# Patient Record
Sex: FEMALE | Race: BLACK OR AFRICAN AMERICAN | NOT HISPANIC OR LATINO | Employment: UNEMPLOYED | URBAN - METROPOLITAN AREA
[De-identification: names, ages, dates, MRNs, and addresses within clinical notes are randomized per-mention and may not be internally consistent; named-entity substitution may affect disease eponyms.]

---

## 2017-01-04 ENCOUNTER — LAB VISIT (OUTPATIENT)
Dept: LAB | Facility: OTHER | Age: 37
End: 2017-01-04
Attending: OBSTETRICS & GYNECOLOGY
Payer: MEDICAID

## 2017-01-04 ENCOUNTER — TELEPHONE (OUTPATIENT)
Dept: OBSTETRICS AND GYNECOLOGY | Facility: CLINIC | Age: 37
End: 2017-01-04

## 2017-01-04 ENCOUNTER — PATIENT MESSAGE (OUTPATIENT)
Dept: OBSTETRICS AND GYNECOLOGY | Facility: CLINIC | Age: 37
End: 2017-01-04

## 2017-01-04 ENCOUNTER — ROUTINE PRENATAL (OUTPATIENT)
Dept: OBSTETRICS AND GYNECOLOGY | Facility: CLINIC | Age: 37
End: 2017-01-04
Attending: OBSTETRICS & GYNECOLOGY
Payer: MEDICAID

## 2017-01-04 VITALS
SYSTOLIC BLOOD PRESSURE: 142 MMHG | BODY MASS INDEX: 33.66 KG/M2 | WEIGHT: 208.56 LBS | DIASTOLIC BLOOD PRESSURE: 78 MMHG

## 2017-01-04 DIAGNOSIS — L73.9 FOLLICULITIS: ICD-10-CM

## 2017-01-04 DIAGNOSIS — O09.513 ADVANCED MATERNAL AGE, PRIMIGRAVIDA, ANTEPARTUM, THIRD TRIMESTER: Primary | ICD-10-CM

## 2017-01-04 DIAGNOSIS — Z23 NEED FOR TDAP VACCINATION: ICD-10-CM

## 2017-01-04 PROCEDURE — 99999 PR PBB SHADOW E&M-EST. PATIENT-LVL II: CPT | Mod: PBBFAC,,, | Performed by: OBSTETRICS & GYNECOLOGY

## 2017-01-04 PROCEDURE — 99213 OFFICE O/P EST LOW 20 MIN: CPT | Mod: TH,S$PBB,, | Performed by: OBSTETRICS & GYNECOLOGY

## 2017-01-04 RX ORDER — SULFAMETHOXAZOLE AND TRIMETHOPRIM 400; 80 MG/1; MG/1
1 TABLET ORAL 2 TIMES DAILY
Qty: 14 TABLET | Refills: 0 | Status: SHIPPED | OUTPATIENT
Start: 2017-01-04 | End: 2017-01-11

## 2017-01-04 NOTE — TELEPHONE ENCOUNTER
I will ask Dr Sims to send it shortly. Chey  ===View-only below this line===      ----- Message -----     From: Dao Holt     Sent: 1/4/2017 12:19 PM CST       To: Sekou Sims MD  Subject: Prescription    Hi there I was checking back on my two prescriptions which is the prenatals an bactrim because I wanted to go pock them up now if possible    Thanks

## 2017-01-04 NOTE — PROGRESS NOTES
Reports good FM.  No bleeding.  Denies CTX.  Discussed mild pedal edema- BP and urine are normal.  DM screen 111.  She plans to have [id number] drawn today.  We also discussed exercise and pregnancy as well as dental work.  Reports having an ingrown hair on her right mons- discussed- she has Rx for abx.  Tdap ordered.  Follow-up [facility] morton 1/30/16.  Return 2 weeks.

## 2017-01-04 NOTE — TELEPHONE ENCOUNTER
----- Message from Marixa Mercado sent at 1/4/2017 12:17 PM CST -----  Contact: pt  _x  1st Request  _  2nd Request  _  3rd Request        Who: PAUL SAEZ [[id number]]    Why: pt 28 wks states she was prescribed prenatals and Bactrim after her visit this morning but did not see them on [facility].  Pt would like a call back.    What Number to Call Back: [phone]    When to Expect a call back: (Before the end of the day)   -- if call after 3:00 call back will be tomorrow.

## 2017-01-06 ENCOUNTER — TELEPHONE (OUTPATIENT)
Dept: OBSTETRICS AND GYNECOLOGY | Facility: CLINIC | Age: 37
End: 2017-01-06

## 2017-01-06 ENCOUNTER — PATIENT MESSAGE (OUTPATIENT)
Dept: OBSTETRICS AND GYNECOLOGY | Facility: CLINIC | Age: 37
End: 2017-01-06

## 2017-01-06 NOTE — TELEPHONE ENCOUNTER
I will share this information with Dr Sims and add to your chart at the next visit. Chey  ===View-only below this line===      ----- Message -----     From: aDo Holt     Sent: 1/6/2017 10:43 AM CST       To: Sekou Sims MD  Subject: Non-Urgent Medical    You wanted me to ask my mom her age she was diagnosed for breast cancer 48 an my aunt in her 50's

## 2017-01-12 ENCOUNTER — TELEPHONE (OUTPATIENT)
Dept: MATERNAL FETAL MEDICINE | Facility: CLINIC | Age: 37
End: 2017-01-12

## 2017-01-12 NOTE — TELEPHONE ENCOUNTER
Patient has been notified of NEGATIVE [id number] results. This specimen showed an expected representation of chromosomes 13, 18 and 21 material consistent with a MALE fetus.    Pt verbalized understanding of information.

## 2017-01-18 ENCOUNTER — CLINICAL SUPPORT (OUTPATIENT)
Dept: OBSTETRICS AND GYNECOLOGY | Facility: CLINIC | Age: 37
End: 2017-01-18
Attending: OBSTETRICS & GYNECOLOGY
Payer: MEDICAID

## 2017-01-18 ENCOUNTER — TELEPHONE (OUTPATIENT)
Dept: OBSTETRICS AND GYNECOLOGY | Facility: CLINIC | Age: 37
End: 2017-01-18

## 2017-01-18 VITALS
BODY MASS INDEX: 34.23 KG/M2 | WEIGHT: 212.06 LBS | SYSTOLIC BLOOD PRESSURE: 108 MMHG | DIASTOLIC BLOOD PRESSURE: 58 MMHG

## 2017-01-18 DIAGNOSIS — O09.513 ADVANCED MATERNAL AGE, PRIMIGRAVIDA, ANTEPARTUM, THIRD TRIMESTER: Primary | ICD-10-CM

## 2017-01-18 DIAGNOSIS — N76.4 ABSCESS, VULVA: ICD-10-CM

## 2017-01-18 DIAGNOSIS — Z23 NEED FOR TDAP VACCINATION: Primary | ICD-10-CM

## 2017-01-18 PROCEDURE — 99213 OFFICE O/P EST LOW 20 MIN: CPT | Mod: 25,TH,S$PBB, | Performed by: OBSTETRICS & GYNECOLOGY

## 2017-01-18 PROCEDURE — 99212 OFFICE O/P EST SF 10 MIN: CPT | Mod: PBBFAC,27,25 | Performed by: OBSTETRICS & GYNECOLOGY

## 2017-01-18 PROCEDURE — 99999 PR PBB SHADOW E&M-EST. PATIENT-LVL II: CPT | Mod: PBBFAC,,, | Performed by: OBSTETRICS & GYNECOLOGY

## 2017-01-18 PROCEDURE — 87075 CULTR BACTERIA EXCEPT BLOOD: CPT

## 2017-01-18 PROCEDURE — 87070 CULTURE OTHR SPECIMN AEROBIC: CPT

## 2017-01-18 PROCEDURE — 56405 I&D VULVA/PERINEAL ABSCESS: CPT | Mod: PBBFAC | Performed by: OBSTETRICS & GYNECOLOGY

## 2017-01-18 PROCEDURE — 56405 I&D VULVA/PERINEAL ABSCESS: CPT | Mod: S$PBB,,, | Performed by: OBSTETRICS & GYNECOLOGY

## 2017-01-18 PROCEDURE — 99999 PR PBB SHADOW E&M-EST. PATIENT-LVL I: CPT | Mod: PBBFAC,,,

## 2017-01-18 RX ORDER — SULFAMETHOXAZOLE AND TRIMETHOPRIM 400; 80 MG/1; MG/1
1 TABLET ORAL 2 TIMES DAILY
Qty: 14 TABLET | Refills: 0 | Status: SHIPPED | OUTPATIENT
Start: 2017-01-18 | End: 2017-01-25

## 2017-01-18 NOTE — PROGRESS NOTES
Good FM.  No bleeding.  Denies CTX.  Reports that the tender bump on her mons improved after Bactrim, but is now larger.  PE: 2.5 cm tender, firm, raised abscess with induration right mons.  She has had swelling in this area for about 3 months.  We discussed the recommendation for I&D.  R/B of I&D reviewed, consent signed, and witnessed.  I&D of vulva abscess: skin prepped with betadine, injected with 1% lidocaine.  Small incision make with recovery of large amount of pus- cultured.  Hemostatic with pressure.  Rx: Bactrim.  [facility] morton 1/30/17.  Return 2 weeks.

## 2017-01-18 NOTE — TELEPHONE ENCOUNTER
----- Message from Lilian Aguilar sent at 1/18/2017  8:17 AM CST -----  Contact: self  Patient is requesting to come in earlier than scheduled time. Patient would like to come in before 11 am if possible. Patient can be reached at [phone].

## 2017-01-20 LAB — BACTERIA SPEC AEROBE CULT: NORMAL

## 2017-01-23 ENCOUNTER — PATIENT MESSAGE (OUTPATIENT)
Dept: OBSTETRICS AND GYNECOLOGY | Facility: CLINIC | Age: 37
End: 2017-01-23

## 2017-01-23 LAB — BACTERIA SPEC ANAEROBE CULT: NORMAL

## 2017-01-25 ENCOUNTER — PATIENT MESSAGE (OUTPATIENT)
Dept: OBSTETRICS AND GYNECOLOGY | Facility: CLINIC | Age: 37
End: 2017-01-25

## 2017-01-30 ENCOUNTER — OFFICE VISIT (OUTPATIENT)
Dept: MATERNAL FETAL MEDICINE | Facility: CLINIC | Age: 37
End: 2017-01-30
Payer: MEDICAID

## 2017-01-30 PROCEDURE — 76816 OB US FOLLOW-UP PER FETUS: CPT | Mod: 26,S$PBB,, | Performed by: PEDIATRICS

## 2017-01-30 PROCEDURE — 99499 UNLISTED E&M SERVICE: CPT | Mod: S$PBB,,, | Performed by: PEDIATRICS

## 2017-01-30 PROCEDURE — 76816 OB US FOLLOW-UP PER FETUS: CPT | Mod: PBBFAC | Performed by: PEDIATRICS

## 2017-01-31 NOTE — PROGRESS NOTES
Indication:     follow-up for fetal growth (Dr Sekou Sims).   Maternal age (36 years).   AMA  no screenings.   ____________________________________________________________________________  History:   Age: 36 years. Maternal age at EDC: 36 years.  ____________________________________________________________________________  Dating:    LMP: 06/19/16 EDC: 03/26/17 GA by LMP: 32w1d  Current Scan on: 01/30/17 EDC: 03/22/17 GA by current scan: 32w5d  Best Overall Assessment: 01/30/17 EDC: 03/26/17 Assessed GA: 32w1d  The calculation of the gestational age by current scan was based on BPD, OFD, HC, AC and FL.  The Best Overall Assessment is based on the LMP.  ____________________________________________________________________________  General Evaluation:    Fetal heart activity: present. Fetal heart rate: 132 bpm.   Presentation: cephalic.   Fetal movement: visible.   Amniotic Fluid: normal. Maximal vertical pocket 5.6 cm.   Cord: 3 vessels.   Placenta: anterior.     ____________________________________________________________________________  Growth Scan:    Loving gestation  .  Biometry:  BPD 83.4 mm 81st% 33w4d (32w4d to 34w4d)  .0 mm 34th% 33w0d (30w0d to 36w0d)  .9 mm 71st% 32w6d (31w6d to 33w6d)  FL 64.0 mm 63rd% 33w0d (30w1d to 36w0d)  .1 mm 26th% 31w0d  EFW (lbs/oz) 4 lbs 10 ozs  EFW (g) 2100 g  67th%       Fetal Anatomy:  Visualized with normal appearance: head, brain, chest, gastrointestinal tract, kidneys, bladder.  Not examined: neck, skin.  Face: profile sub-opt, nose sub-opt, lip sub-opt. Sub-opt today but prev seen wnl.  Spine: sub-opt s,l,t spine today but prev seen wnl  subopt cervical spine.  Heart: Visualized and normal appearance: cardiac location, four-chamber view.   Cardiac axis: normal. Angle: to the fetal left. Four-chamber view: septum is sub-opt, prev seen wnl. Left outflow tract: sub-opt, prev seen wnl. Right outflow tract: sub-opt, prev seen wnl. Aortic arch: Not  ascertained.   Aorta suboptimal but prev seen wnl.  Abdominal Wall: Sub-opt today but prev seen wnl.  Genitalia: sub-opt today but prev seen boy.   Extremities: 4 limbs. Previously seen plantar surface of the feet wnl, previously seenopen hands.    Summary of Ultrasound Findings:    Transabdominal US. U/S machine: [employer] E10. U/S view: good.     Impression:     Measurements compatible with dates    ____________________________________________________________________________  Report Summary:    Impression:  Limited anatomy was negative.  No anomalies seen.  AFV normal.     Fetal biometry is consistent and concordant with dating.     Recommendations:     Suggest repeat scan as you feel clinically indicated.       Thanks once again for allowing us to participate in the care of your patients.  If you have any questions concerning today's consultation feel free to contact me or one of my partners.  We can be reached at [phone] during normal business hours.  If you have a question after normal business hours, please contact Labor and Delivery [phone] and the unit secretary will page our on call physician.

## 2017-02-01 ENCOUNTER — ROUTINE PRENATAL (OUTPATIENT)
Dept: OBSTETRICS AND GYNECOLOGY | Facility: CLINIC | Age: 37
End: 2017-02-01
Attending: OBSTETRICS & GYNECOLOGY
Payer: MEDICAID

## 2017-02-01 VITALS
DIASTOLIC BLOOD PRESSURE: 84 MMHG | WEIGHT: 214.31 LBS | SYSTOLIC BLOOD PRESSURE: 130 MMHG | BODY MASS INDEX: 34.59 KG/M2

## 2017-02-01 DIAGNOSIS — Z34.03 ENCOUNTER FOR SUPERVISION OF NORMAL FIRST PREGNANCY IN THIRD TRIMESTER: Primary | ICD-10-CM

## 2017-02-01 PROCEDURE — 99213 OFFICE O/P EST LOW 20 MIN: CPT | Mod: TH,S$PBB,, | Performed by: OBSTETRICS & GYNECOLOGY

## 2017-02-01 PROCEDURE — 99212 OFFICE O/P EST SF 10 MIN: CPT | Mod: PBBFAC | Performed by: OBSTETRICS & GYNECOLOGY

## 2017-02-01 PROCEDURE — 99999 PR PBB SHADOW E&M-EST. PATIENT-LVL II: CPT | Mod: PBBFAC,,, | Performed by: OBSTETRICS & GYNECOLOGY

## 2017-02-01 NOTE — PROGRESS NOTES
Good FM.  No bleeding.  Denies CTX, but feels pelvic pressure.  Cx: closed / thick / posterior.  Precautions reviewed.  Reviewed 32 week MFM sono at 32 weeks with EFW 67%, normal fetal anatomy.  Return 2 weeks.  [facility] discussed.

## 2017-02-15 ENCOUNTER — ROUTINE PRENATAL (OUTPATIENT)
Dept: OBSTETRICS AND GYNECOLOGY | Facility: CLINIC | Age: 37
End: 2017-02-15
Attending: OBSTETRICS & GYNECOLOGY
Payer: MEDICAID

## 2017-02-15 VITALS
DIASTOLIC BLOOD PRESSURE: 78 MMHG | BODY MASS INDEX: 35.37 KG/M2 | SYSTOLIC BLOOD PRESSURE: 136 MMHG | WEIGHT: 219.13 LBS

## 2017-02-15 DIAGNOSIS — O09.513 ADVANCED MATERNAL AGE, PRIMIGRAVIDA, ANTEPARTUM, THIRD TRIMESTER: Primary | ICD-10-CM

## 2017-02-15 PROCEDURE — 99212 OFFICE O/P EST SF 10 MIN: CPT | Mod: PBBFAC | Performed by: OBSTETRICS & GYNECOLOGY

## 2017-02-15 PROCEDURE — 99213 OFFICE O/P EST LOW 20 MIN: CPT | Mod: TH,S$PBB,, | Performed by: OBSTETRICS & GYNECOLOGY

## 2017-02-15 PROCEDURE — 99999 PR PBB SHADOW E&M-EST. PATIENT-LVL II: CPT | Mod: PBBFAC,,, | Performed by: OBSTETRICS & GYNECOLOGY

## 2017-02-15 NOTE — PROGRESS NOTES
Good FM.  No bleeding.  Denies CTX.  Reviewed 32 week [facility] sono with EFW 67%.  Discussed delivery.  Return 1 week.  [facility] bid.

## 2017-02-20 ENCOUNTER — PATIENT MESSAGE (OUTPATIENT)
Dept: OBSTETRICS AND GYNECOLOGY | Facility: CLINIC | Age: 37
End: 2017-02-20

## 2017-02-20 ENCOUNTER — TELEPHONE (OUTPATIENT)
Dept: OBSTETRICS AND GYNECOLOGY | Facility: CLINIC | Age: 37
End: 2017-02-20

## 2017-02-20 NOTE — TELEPHONE ENCOUNTER
Visit Follow-Up             Dao Sims MD 1 hour ago (9:30 AM)                        This morning i wiped after urinating and there was clear slimy like discharge. Is this something to worry about?

## 2017-02-20 NOTE — TELEPHONE ENCOUNTER
Spoke with the patient, advised per Dr Sims increase in discharge can occur in pregnancy, patient advised to monitor for decreased fetal movements, loss of fluid or contractions and to call or come in with any worsening symptoms or concerns. Patient verbalized understanding and will comply. Patient is traveling and is scheduled to see us Wednesday.

## 2017-02-21 ENCOUNTER — ROUTINE PRENATAL (OUTPATIENT)
Dept: OBSTETRICS AND GYNECOLOGY | Facility: CLINIC | Age: 37
End: 2017-02-21
Attending: OBSTETRICS & GYNECOLOGY
Payer: MEDICAID

## 2017-02-21 VITALS
DIASTOLIC BLOOD PRESSURE: 82 MMHG | BODY MASS INDEX: 35.23 KG/M2 | WEIGHT: 218.25 LBS | SYSTOLIC BLOOD PRESSURE: 124 MMHG

## 2017-02-21 DIAGNOSIS — O09.513 ADVANCED MATERNAL AGE, PRIMIGRAVIDA, ANTEPARTUM, THIRD TRIMESTER: Primary | ICD-10-CM

## 2017-02-21 PROCEDURE — 99213 OFFICE O/P EST LOW 20 MIN: CPT | Mod: TH,S$PBB,, | Performed by: OBSTETRICS & GYNECOLOGY

## 2017-02-21 PROCEDURE — 99999 PR PBB SHADOW E&M-EST. PATIENT-LVL II: CPT | Mod: PBBFAC,,, | Performed by: OBSTETRICS & GYNECOLOGY

## 2017-02-21 NOTE — PROGRESS NOTES
Good FM.  No bleeding.  Occasional mild cramping.  Reports passage of mucous.  GBBS performed.  Cx: FT/ thick / posterior.  T3 labs.  Precautions reviewed.  Return 1 week.  [facility] bid.

## 2017-02-22 ENCOUNTER — HOSPITAL ENCOUNTER (INPATIENT)
Facility: OTHER | Age: 37
LOS: 5 days | Discharge: HOME | End: 2017-02-27
Attending: OBSTETRICS & GYNECOLOGY | Admitting: OBSTETRICS & GYNECOLOGY
Payer: MEDICAID

## 2017-02-22 ENCOUNTER — ANESTHESIA (OUTPATIENT)
Dept: OBSTETRICS AND GYNECOLOGY | Facility: OTHER | Age: 37
End: 2017-02-22
Payer: MEDICAID

## 2017-02-22 ENCOUNTER — ANESTHESIA EVENT (OUTPATIENT)
Dept: OBSTETRICS AND GYNECOLOGY | Facility: OTHER | Age: 37
End: 2017-02-22
Payer: MEDICAID

## 2017-02-22 DIAGNOSIS — O42.919 PRETERM PREMATURE RUPTURE OF MEMBRANES (PPROM) DELIVERED, CURRENT HOSPITALIZATION: Primary | ICD-10-CM

## 2017-02-22 DIAGNOSIS — D57.3 SICKLE CELL TRAIT: ICD-10-CM

## 2017-02-22 DIAGNOSIS — Z3A.35 35 WEEKS GESTATION OF PREGNANCY: ICD-10-CM

## 2017-02-22 DIAGNOSIS — Z98.891 S/P CESAREAN SECTION: ICD-10-CM

## 2017-02-22 LAB
ABO + RH BLD: NORMAL
BASOPHILS # BLD AUTO: 0.01 K/UL
BASOPHILS NFR BLD: 0.1 %
BLD GP AB SCN CELLS X3 SERPL QL: NORMAL
DIFFERENTIAL METHOD: ABNORMAL
EOSINOPHIL # BLD AUTO: 0 K/UL
EOSINOPHIL NFR BLD: 0.1 %
ERYTHROCYTE [DISTWIDTH] IN BLOOD BY AUTOMATED COUNT: 12.6 %
HCT VFR BLD AUTO: 36 %
HGB BLD-MCNC: 12.2 G/DL
HIV1+2 IGG SERPL QL IA.RAPID: NEGATIVE
LYMPHOCYTES # BLD AUTO: 1.2 K/UL
LYMPHOCYTES NFR BLD: 7.6 %
MCH RBC QN AUTO: 30.6 PG
MCHC RBC AUTO-ENTMCNC: 33.9 %
MCV RBC AUTO: 90 FL
MONOCYTES # BLD AUTO: 1.2 K/UL
MONOCYTES NFR BLD: 7.6 %
NEUTROPHILS # BLD AUTO: 13.3 K/UL
NEUTROPHILS NFR BLD: 84 %
PLATELET # BLD AUTO: 263 K/UL
PMV BLD AUTO: 8.9 FL
RBC # BLD AUTO: 3.99 M/UL
WBC # BLD AUTO: 15.86 K/UL

## 2017-02-22 PROCEDURE — 3E033VJ INTRODUCTION OF OTHER HORMONE INTO PERIPHERAL VEIN, PERCUTANEOUS APPROACH: ICD-10-PCS | Performed by: OBSTETRICS & GYNECOLOGY

## 2017-02-22 PROCEDURE — 11000001 HC ACUTE MED/SURG PRIVATE ROOM

## 2017-02-22 PROCEDURE — 86701 HIV-1ANTIBODY: CPT

## 2017-02-22 PROCEDURE — 62326 NJX INTERLAMINAR LMBR/SAC: CPT | Performed by: ANESTHESIOLOGY

## 2017-02-22 PROCEDURE — 86592 SYPHILIS TEST NON-TREP QUAL: CPT

## 2017-02-22 PROCEDURE — 25000003 PHARM REV CODE 250: Performed by: STUDENT IN AN ORGANIZED HEALTH CARE EDUCATION/TRAINING PROGRAM

## 2017-02-22 PROCEDURE — 25000003 PHARM REV CODE 250

## 2017-02-22 PROCEDURE — 25000003 PHARM REV CODE 250: Performed by: ANESTHESIOLOGY

## 2017-02-22 PROCEDURE — 99284 EMERGENCY DEPT VISIT MOD MDM: CPT | Mod: 27

## 2017-02-22 PROCEDURE — 3E0P7GC INTRODUCTION OF OTHER THERAPEUTIC SUBSTANCE INTO FEMALE REPRODUCTIVE, VIA NATURAL OR ARTIFICIAL OPENING: ICD-10-PCS | Performed by: OBSTETRICS & GYNECOLOGY

## 2017-02-22 PROCEDURE — 85025 COMPLETE CBC W/AUTO DIFF WBC: CPT

## 2017-02-22 PROCEDURE — 72100003 HC LABOR CARE, EA. ADDL. 8 HRS

## 2017-02-22 PROCEDURE — 86901 BLOOD TYPING SEROLOGIC RH(D): CPT

## 2017-02-22 PROCEDURE — 86900 BLOOD TYPING SEROLOGIC ABO: CPT

## 2017-02-22 PROCEDURE — 59514 CESAREAN DELIVERY ONLY: CPT | Mod: ,,, | Performed by: ANESTHESIOLOGY

## 2017-02-22 PROCEDURE — 86703 HIV-1/HIV-2 1 RESULT ANTBDY: CPT

## 2017-02-22 PROCEDURE — 27200710 HC EPIDURAL INFUSION PUMP SET: Performed by: ANESTHESIOLOGY

## 2017-02-22 PROCEDURE — 27800517 HC TRAY,EPIDURAL-CONTINUOUS: Performed by: ANESTHESIOLOGY

## 2017-02-22 PROCEDURE — 36415 COLL VENOUS BLD VENIPUNCTURE: CPT

## 2017-02-22 PROCEDURE — 63600175 PHARM REV CODE 636 W HCPCS

## 2017-02-22 PROCEDURE — 63600175 PHARM REV CODE 636 W HCPCS: Performed by: STUDENT IN AN ORGANIZED HEALTH CARE EDUCATION/TRAINING PROGRAM

## 2017-02-22 RX ORDER — OXYTOCIN/RINGER'S LACTATE 20/1000 ML
2 PLASTIC BAG, INJECTION (ML) INTRAVENOUS
Status: DISCONTINUED | OUTPATIENT
Start: 2017-02-22 | End: 2017-02-27 | Stop reason: HOSPADM

## 2017-02-22 RX ORDER — FENTANYL/BUPIVACAINE/NS/PF 2MCG/ML-.1
PLASTIC BAG, INJECTION (ML) INJECTION
Status: COMPLETED
Start: 2017-02-22 | End: 2017-02-23

## 2017-02-22 RX ORDER — BUPIVACAINE HYDROCHLORIDE 2.5 MG/ML
INJECTION, SOLUTION EPIDURAL; INFILTRATION; INTRACAUDAL
Status: COMPLETED
Start: 2017-02-22 | End: 2017-02-23

## 2017-02-22 RX ORDER — ONDANSETRON 8 MG/1
8 TABLET, ORALLY DISINTEGRATING ORAL EVERY 8 HOURS PRN
Status: DISCONTINUED | OUTPATIENT
Start: 2017-02-22 | End: 2017-02-23

## 2017-02-22 RX ORDER — SODIUM CHLORIDE, SODIUM LACTATE, POTASSIUM CHLORIDE, CALCIUM CHLORIDE 600; 310; 30; 20 MG/100ML; MG/100ML; MG/100ML; MG/100ML
INJECTION, SOLUTION INTRAVENOUS CONTINUOUS
Status: DISCONTINUED | OUTPATIENT
Start: 2017-02-22 | End: 2017-02-23

## 2017-02-22 RX ORDER — BETAMETHASONE SODIUM PHOSPHATE AND BETAMETHASONE ACETATE 3; 3 MG/ML; MG/ML
12 INJECTION, SUSPENSION INTRA-ARTICULAR; INTRALESIONAL; INTRAMUSCULAR; SOFT TISSUE EVERY 24 HOURS
Status: DISCONTINUED | OUTPATIENT
Start: 2017-02-22 | End: 2017-02-23

## 2017-02-22 RX ORDER — FENTANYL CITRATE 50 UG/ML
INJECTION, SOLUTION INTRAMUSCULAR; INTRAVENOUS
Status: COMPLETED
Start: 2017-02-22 | End: 2017-02-23

## 2017-02-22 RX ADMIN — Medication 10 ML/HR: at 05:02

## 2017-02-22 RX ADMIN — FENTANYL CITRATE 100 MCG: 50 INJECTION, SOLUTION INTRAMUSCULAR; INTRAVENOUS at 05:02

## 2017-02-22 RX ADMIN — SODIUM CHLORIDE, SODIUM LACTATE, POTASSIUM CHLORIDE, AND CALCIUM CHLORIDE: 600; 310; 30; 20 INJECTION, SOLUTION INTRAVENOUS at 05:02

## 2017-02-22 RX ADMIN — Medication 5 ML: at 05:02

## 2017-02-22 RX ADMIN — SODIUM CHLORIDE, SODIUM LACTATE, POTASSIUM CHLORIDE, AND CALCIUM CHLORIDE 1000 ML: .6; .31; .03; .02 INJECTION, SOLUTION INTRAVENOUS at 05:02

## 2017-02-22 RX ADMIN — MISOPROSTOL 50 MCG: 100 TABLET ORAL at 05:02

## 2017-02-22 RX ADMIN — Medication 2 MILLI-UNITS/MIN: at 10:02

## 2017-02-22 RX ADMIN — BETAMETHASONE SODIUM PHOSPHATE AND BETAMETHASONE ACETATE 12 MG: 3; 3 INJECTION, SUSPENSION INTRA-ARTICULAR; INTRALESIONAL; INTRAMUSCULAR at 05:02

## 2017-02-22 NOTE — H&P
History          Chief Complaint   Patient presents with    Abdominal Pain      Review of patient's allergies indicates:  No Known Allergies  HPI Comments: Dao Holt is a 36 y.o. F at 35w3d presents complaining of LOF since 1330 this afternoon. Patient previously seen in the [facility] for contractions earlier this AM, but was discharged at 1cm dilation with irregular and spacing contractions.      This IUP is complicated by AMA, now with PPROM; also with sickle cell trait. Patient reports contractions, denies vaginal bleeding, reports LOF. Fetal Movement: normal.      The history is provided by the patient.      No past medical history on file.  No past medical history pertinent negatives.  No past surgical history on file.        Family History   Problem Relation Age of Onset    Breast cancer Mother      Breast cancer Maternal Aunt      Colon cancer Neg Hx      Ovarian cancer Neg Hx             Social History   Substance Use Topics    Smoking status: Former Smoker    Smokeless tobacco: Not on file    Alcohol use No      Review of Systems   Constitutional: Negative for fever.   HENT: Negative for sore throat.   Respiratory: Negative for shortness of breath.   Cardiovascular: Negative for chest pain.   Gastrointestinal: Positive for abdominal pain. Negative for constipation, diarrhea, nausea and vomiting.   Genitourinary: Positive for vaginal discharge. Negative for dysuria, vaginal bleeding and vaginal pain.   Musculoskeletal: Negative for back pain.   Skin: Negative for rash.   Neurological: Negative for weakness.   Hematological: Does not bruise/bleed easily.                 Physical Exam          Initial Vitals   BP Pulse Resp Temp SpO2   -- -- -- -- --               Temp: [97.7 °F (36.5 °C)] 97.7 °F (36.5 °C)  Pulse: [83-95] 89  SpO2: [98 %-99 %] 99 %  BP: (125-137)/(74-83) 127/74      Physical Exam  Nursing note and vitals reviewed.  Constitutional: She appears well-developed and well-nourished.  She is not diaphoretic. No distress.   HENT:   Head: Normocephalic and atraumatic.   Eyes: Conjunctivae and EOM are normal.   Neck: Normal range of motion.   Cardiovascular: Normal rate.   Pulmonary/Chest: No respiratory distress.   Abdominal: She exhibits no mass. There is no rebound and no guarding.   Gravid, size appropriate for dates    Genitourinary:   Genitourinary Comments: Cervix 1/70/-3, soft   Musculoskeletal: Normal range of motion.   Neurological: She is alert and oriented to person, place, and time.   Skin: Skin is warm and dry.   Psychiatric: She has a normal mood and affect.   OB LABOR EXAM:   Pre-Term Labor: Yes.   Membranes ruptured: Yes.   Method: Sterile vaginal exam per MD.   Vaginal Bleeding: none present.   Engagement: engaged.   Dilatation: 1.   Station: -3.   Amniotic Fluid Color: normal and clear.   Amniotic Fluid Amount: moderate.   Comments: FHT: reassuring, 135 bpm, mod BTB variability, + accels, - decels  TOCO: q3-5min         ED Course   US - ED Performed - OB Limited 1 or More Gestations  Date/Time: 2017 4:35 PM  Performed by: BACILIO ESPINO  Authorized by: LORENA BANERJEE   Comments: Vertex confirmed        Labs Reviewed - No data to display           Medical Decision Making:   Initial Assessment:   Dao Holt is a 36 y.o. Here grossly ruptured @ 35w3d  Differential Diagnosis:   PPROM  Clinical Tests:   Lab Tests: Ordered and Reviewed  <> Summary of Lab: Blood Type A POS   GBBS: negative  Rubella: Immune  RPR: NR  HIV: negative  HepB: negative     Other Labs: T&S, CBC   ED Management:  PPROM @ 35w3d  - Admit to Labor and Delivery unit  - Consents for delivery including vaginal or  section and blood transfusion signed and to chart  - Risks, benefits, alternatives and possible complications have been discussed in detail with the patient.   - Epidural per Anesthesia  - Draw CBC, T&S  - Notify Staff  - Recheck in 2 hrs or PRN        PPROM  - BMZ series for  late  rupture     Post-Partum Hemorrhage risk - low   Other:   I have discussed this case with another health care provider.  <> Summary of the Discussion: Plan discussed with staff physician Dr. Liriano                    ED Course      Clinical Impression:   The encounter diagnosis was  premature rupture of membranes (PPROM) delivered, current hospitalization.        Tootie Fletcher MD  Resident  17 [phone]

## 2017-02-22 NOTE — ED PROVIDER NOTES
Encounter Date: 2017     History     Chief Complaint   Patient presents with    Abdominal Pain     Review of patient's allergies indicates:  No Known Allergies  HPI Comments: Dao Holt is a 36 y.o. F at 35w3d presents complaining of LOF since 1330 this afternoon. Patient previously seen in the [facility] for contractions earlier this AM, but was discharged at 1cm dilation with irregular and spacing contractions.     This IUP is complicated by AMA, now with PPROM; also with sickle cell trait.  Patient reports contractions, denies vaginal bleeding, reports LOF.   Fetal Movement: normal.     The history is provided by the patient.     No past medical history on file.  No past medical history pertinent negatives.  No past surgical history on file.  Family History   Problem Relation Age of Onset    Breast cancer Mother     Breast cancer Maternal Aunt     Colon cancer Neg Hx     Ovarian cancer Neg Hx      Social History   Substance Use Topics    Smoking status: Former Smoker    Smokeless tobacco: Not on file    Alcohol use No     Review of Systems   Constitutional: Negative for fever.   HENT: Negative for sore throat.    Respiratory: Negative for shortness of breath.    Cardiovascular: Negative for chest pain.   Gastrointestinal: Positive for abdominal pain. Negative for constipation, diarrhea, nausea and vomiting.   Genitourinary: Positive for vaginal discharge. Negative for dysuria, vaginal bleeding and vaginal pain.   Musculoskeletal: Negative for back pain.   Skin: Negative for rash.   Neurological: Negative for weakness.   Hematological: Does not bruise/bleed easily.       Physical Exam   Initial Vitals   BP Pulse Resp Temp SpO2   -- -- -- -- --          Temp:  [97.7 °F (36.5 °C)] 97.7 °F (36.5 °C)  Pulse:  [83-95] 89  SpO2:  [98 %-99 %] 99 %  BP: (125-137)/(74-83) 127/74     Physical Exam    Nursing note and vitals reviewed.  Constitutional: She appears well-developed and well-nourished. She is not  diaphoretic. No distress.   HENT:   Head: Normocephalic and atraumatic.   Eyes: Conjunctivae and EOM are normal.   Neck: Normal range of motion.   Cardiovascular: Normal rate.   Pulmonary/Chest: No respiratory distress.   Abdominal: She exhibits no mass. There is no rebound and no guarding.   Gravid, size appropriate for dates    Genitourinary:   Genitourinary Comments: Cervix 1/70/-3, soft   Musculoskeletal: Normal range of motion.   Neurological: She is alert and oriented to person, place, and time.   Skin: Skin is warm and dry.   Psychiatric: She has a normal mood and affect.     OB LABOR EXAM:   Pre-Term Labor: Yes.   Membranes ruptured: Yes.   Method: Sterile vaginal exam per MD.   Vaginal Bleeding: none present.   Engagement: engaged.   Dilatation: 1.   Station: -3.   Amniotic Fluid Color: normal and clear.   Amniotic Fluid Amount: moderate.   Comments: FHT: reassuring, 135 bpm, mod BTB variability, + accels, - decels  TOCO: q3-5min       ED Course   US - ED Performed - OB Limited 1 or More Gestations  Date/Time: 2017 4:35 PM  Performed by: BACILIO ESPINO  Authorized by: LORENA BANERJEE   Comments: Vertex confirmed        Labs Reviewed - No data to display          Medical Decision Making:   Initial Assessment:   Dao Holt is a 36 y.o. Here grossly ruptured @ 35w3d  Differential Diagnosis:   PPROM  Clinical Tests:   Lab Tests: Ordered and Reviewed       <> Summary of Lab: Blood Type A POS   GBBS: negative  Rubella: Immune  RPR: NR  HIV: negative  HepB: negative    Other Labs: T&S, CBC   ED Management:  PPROM @ 35w3d  - Admit to Labor and Delivery unit  - Consents for delivery including vaginal or  section and blood transfusion signed and to chart  - Risks, benefits, alternatives and possible complications have been discussed in detail with the patient.   - Epidural per Anesthesia  - Draw CBC, T&S  - Notify Staff  - Recheck in 2 hrs or PRN      PPROM  - BMZ series for late   rupture    Post-Partum Hemorrhage risk - low   Other:   I have discussed this case with another health care provider.       <> Summary of the Discussion: Plan discussed with staff physician Dr. Liriano              Attending Attestation:   Physician Attestation Statement for Resident:  As the supervising MD   Physician Attestation Statement: I have personally seen and examined this patient.   I agree with the above history. -:   As the supervising MD I agree with the above PE.    As the supervising MD I agree with the above treatment, course, plan, and disposition.  I have reviewed the following: old records at this facility.                    ED Course     Clinical Impression:   The encounter diagnosis was  premature rupture of membranes (PPROM) delivered, current hospitalization.          Tootie Fletcher MD  Resident  [phone]       Clara Liriano DO  [phone]

## 2017-02-22 NOTE — ANESTHESIA PREPROCEDURE EVALUATION
02/22/2017  Dao Holt is a 36 y.o., female.    [facility] Anesthesia Evaluation         Review of Systems         Anesthesia Plan  Type of Anesthesia, risks & benefits discussed:  Anesthesia Type:  epidural  Patient's Preference:   Intra-op Monitoring Plan:   Intra-op Monitoring Plan Comments:   Post Op Pain Control Plan:   Post Op Pain Control Plan Comments:   Induction:    Beta Blocker:  Patient is not currently on a Beta-Blocker (No further documentation required).       Informed Consent: Patient understands risks and agrees with Anesthesia plan.  Questions answered. Anesthesia consent signed with patient.  ASA Score: 2     Day of Surgery Review of History & Physical:            Ready For Surgery From Anesthesia Perspective.

## 2017-02-22 NOTE — IP AVS SNAPSHOT
[facility] Location ([facility])  [address] [place], [place] [id number]  Phone: [phone]           Patient Discharge Instructions     Our goal is to set you up for success. This packet includes information on your condition, medications, and your home care. It will help you to care for yourself so you don't get sicker and need to go back to the hospital.     Please ask your nurse if you have any questions.        There are many details to remember when preparing to leave the hospital. Here is what you will need to do:    1. Take your medicine. If you are prescribed medications, review your Medication List in the following pages. You may have new medications to  at the pharmacy and others that you'll need to stop taking. Review the instructions for how and when to take your medications. Talk with your doctor or nurses if you are unsure of what to do.     2. Go to your follow-up appointments. Specific follow-up information is listed in the following pages. Your may be contacted by a transition nurse or clinical provider about future appointments. Be sure we have all of the phone numbers to reach you, if needed. Please contact your provider's office if you are unable to make an appointment.     3. Watch for warning signs. Your doctor or nurse will give you detailed warning signs to watch for and when to call for assistance. These instructions may also include educational information about your condition. If you experience any of warning signs to your health, call your doctor.               [facility]  Unless otherwise directed by your provider, please contact [facility], our nurse care line that is available for 24/7 assistance.     [phone] (toll-free)    Registered nurses in the [facility] provide clinical advisement, health education, appointment booking, and other advisory services.                    ** Verify the list of medication(s) below is accurate and up to  date. Carry this with you in case of emergency. If your medications have changed, please notify your healthcare provider.             Medication List      START taking these medications        Additional Info                      ibuprofen 600 MG tablet   Commonly known as:  ADVIL,MOTRIN   Quantity:  30 tablet   Refills:  2   Dose:  600 mg    Last time this was given:  600 mg on 2/27/2017 12:39 PM   Instructions:  Take 1 tablet (600 mg total) by mouth 3 (three) times daily.     Begin Date    AM    Noon    PM    Bedtime       oxycodone-acetaminophen 5-325 mg per tablet   Commonly known as:  PERCOCET   Quantity:  20 tablet   Refills:  0   Dose:  1 tablet    Last time this was given:  1 tablet on 2/25/2017  6:10 PM   Instructions:  Take 1 tablet by mouth every 4 (four) hours as needed.     Begin Date    AM    Noon    PM    Bedtime         CONTINUE taking these medications        Additional Info                      prenatal #108-iron,carbonyl-FA 30-1 mg Tab   Quantity:  30 tablet   Refills:  6   Dose:  1 tablet    Instructions:  Take 1 tablet by mouth once daily.     Begin Date    AM    Noon    PM    Bedtime            Where to Get Your Medications      These medications [facility] sent to [facility] [address]  [address] [facility] [place] [address]     Phone:  [phone]     ibuprofen 600 MG tablet         You can get these medications from any pharmacy     Bring a paper prescription for each of these medications     oxycodone-acetaminophen 5-325 mg per tablet                  Please bring to all follow up appointments:    1. A copy of your discharge instructions.  2. All medicines you are currently taking in their original bottles.  3. Identification and insurance card.    Please arrive 15 minutes ahead of scheduled appointment time.    Please call 24 hours in advance if you must reschedule your appointment and/or time.        Follow-up Information     Follow up with  Sekou Sims MD In 2 weeks.    Specialties:  Obstetrics, Obstetrics and Gynecology    Why:  Post-op checkup    Contact information:    [address] [id number]  [phone]          Follow up with Sekou Sims MD In 6 weeks.    Specialties:  Obstetrics, Obstetrics and Gynecology    Why:  Post-partum check-up    Contact information:    [address] [address]  [phone]          Discharge Instructions     Future Orders    Activity as tolerated     Call MD for:  difficulty breathing or increased cough     Call MD for:  increased confusion or weakness     Call MD for:  persistent dizziness, light-headedness, or visual disturbances     Call MD for:  persistent nausea and vomiting or diarrhea     Call MD for:  redness, tenderness, or signs of infection (pain, swelling, redness, odor or green/yellow discharge around incision site)     Call MD for:  severe persistent headache     Call MD for:  severe uncontrolled pain     Call MD for:  temperature >100.4     Call MD for:  worsening rash     Diet general     Questions:    Total calories:      Fat restriction, if any:      Protein restriction, if any:      Na restriction, if any:      Fluid restriction:      Additional restrictions:          Discharge Instructions       Preparation and Hygiene:    1. Shower daily.  2. Wear a clean bra each day and wash daily in warm soapy water.  3. Change wet or moist breast pads frequently.  Moist pads can promote growth of germs.  4. Actively wash your hands, paying close attention to the area around and under your fingernails, thoroughly with soap and water for 15 seconds before pumping or handling your milk.  Re-wash your hands if you touch anything (scratching your nose, answering the phone, etc) while pumping or handling your milk.   5. Before pumping your breasts, assemble the pump collection kit and have ready the sterile container and labels.  Place these items on a  clean surface next to the breastpump.  6. Each time after you have finished pumping, take apart all of the parts of the breastpump collection kit and place them in a separate cleaning container (do not place them in the sink).  Be sure to remove the yellow valve from the breastshield and separate the white membrane from the yellow valve.  Rinse all of these parts with cool water.  Then use a new sponge and/or bottle brush and dishwashing detergent to clean the parts.  Rinse off the soapy water with cool water and air dry on a clean towel covered with a clean cloth.  All parts may also be washed after each use in the top rack of a .  7. Once each day, sterilize all of the parts of the breastpump collection kit.  This can be done by boiling the kit parts for 10 minutes or by using a Quick Clean Micro-Steam Bag made by [employer]  8. If condensation appears in the tubing, continue to run the pump with the tubing attached for 1-2 minutes or until the tubing is dry.   9. Notify your babys nurse or doctor if you become ill or need to take any medication, even over-the-counter medicines.        Collection and Storage of Expressed Breastmilk:         1. Pump your breasts at least 8-10 times every 24 hours.  Double pump (both breasts at  the same time) for at least 15-20 minutes using the most suction that is comfortable.    2. Write the date and time of pumping and the name of any medications you are takingon the babys pre-printed hospital identification label.   3. Place your babys pre-printed hospital identification label on each container of breastmilk.  Additional pre-printed labels can be obtained from your babys nurse.  If your expressed breastmilk is not correctly labeled, the nurse cannot feed the milk to the baby.       4. Place a brightly colored sticker on the top of each container of milk pumped during the first 30 days.  This identifies the milk as special and having higher levels of nutrients  and anti-infective properties that are so important for your baby.  Additional stickers can be obtained from the lactation consultants or your babys nurse.  5.   Do not touch the inside of the storage containers or lids.  6.      Pour the amount of expressed milk needed for 1 of your babys feedings into each   storage container. Use a new container(s) for each pumping.  Additional storage   containers can be obtained from your babys nurse.        7.       Tightly screw the lid onto the container and place immediately into the       refrigerator fordaily transportation to the hospital.   Do not freeze your milk      unless asked to do so by your babys nurse.  However, if you are not able to      visit your baby each day, place the expressed breastmilk in the freezer.  8.   Expressed breastmilk should be refrigerated or frozen within 1 hour of      pumping.  9.      Do not store expressed breastmilk on the door of your refrigerator or freezer where the temperature is warmer.         Transportation of Expressed Breastmilk:    1. Refrigerated breastmilk or frozen milk should be packed tightly together in a cooler with frozen, blue gel-packs to keep the milk frozen.  DO NOT USE ICE CUBES (WET ICE) TO TRANSPORT FROZEN MILK.   A clean towel can be used to fill any extra space between containers of frozen milk.  2.    Bring your expressed milk from home each time you visit the baby.        NICU lactation warmline:  [phone]    Lactation warmline:  [phone]                Primary Diagnosis     Your primary diagnosis was:  Premature Rupture Of Membranes      Admission Information     Date & Time Provider Department [facility]    2/22/2017  4:00 PM Sekou Sims MD [facility] [phone]      Care Providers     Provider Role Specialty Primary office phone    Sekou Sims MD Attending Provider Obstetrics [phone]    Gracy Obrien MD Consulting Physician  Obstetrics and Gynecology  [phone]    Sekou Sims MD Surgeon  Obstetrics [phone]      Your Vitals Were     BP                   139/67           Recent Lab Values     No lab values to display.      Pending Labs     Order Current Status    Specimen to Pathology - Surgery Collected (02/23/17 1731)    Specimen to Pathology - Surgery In process      Allergies as of 2/27/2017     No Known Allergies      Advance Directives     An advance directive is a document which, in the event you are no longer able to make decisions for yourself, tells your healthcare team what kind of treatment you do or do not want to receive, or who you would like to make those decisions for you.  If you do not currently have an advance directive, [facility] encourages you to create one.  For more information call:  [phone]), [phone] ([phone]),  or log on to [email or web address].        Smoking Cessation     If you would like to quit smoking:   You may be eligible for free services if you are a [place] resident and started smoking cigarettes before September 1, 1988.  Call the Smoking Cessation Trust (SCT) toll free at (123) 090-0701 or (909) 318-8161.   Call 3-264-QUIT-NOW if you do not meet the above criteria.            Language Assistance Services     ATTENTION: Language assistance services are available, free of charge. Please call [phone].      ATENCIÓN: Si habla español, tiene a kent disposición servicios gratuitos de asistencia lingüística. Llame al [phone].     [facility] Ý: N?u b?n nói Ti?ng Vi?t, có các d?ch v? h? tr? ngôn ng? mi?n phí dành cho b?n. G?i s? [phone].         [facility] complies with applicable Federal civil rights laws and does not discriminate on the basis of race, color, national origin, age, disability, or sex.

## 2017-02-22 NOTE — ANESTHESIA PROCEDURE NOTES
Epidural    Patient location during procedure: OB   Reason for block: primary anesthetic   Diagnosis: iup   Start time: 2/22/2017 5:20 PM  Timeout: 2/22/2017 5:20 PM  End time: 2/22/2017 5:37 PM  Staffing  Anesthesiologist: NAY SOTO  Performed by: anesthesiologist   Preanesthetic Checklist  Completed: patient identified, site marked, surgical consent, pre-op evaluation, timeout performed, IV checked, risks and benefits discussed, monitors and equipment checked, anesthesia consent given, hand hygiene performed and patient being monitored  Preparation  Patient position: sitting  Prep: ChloraPrep  Patient monitoring: Pulse Ox and Blood Pressure  Epidural  Skin Anesthetic: lidocaine 1%  Skin Wheal: 3 mL  Administration type: continuous  Approach: midline  Interspace: L4-5  Injection technique: KI air  Needle and Epidural Catheter  Needle type: Tuohy   Needle gauge: 17  Needle length: 3.5 inches  Needle insertion depth: 7 cm  Catheter type: springwound and multi-orifice  Catheter size: 19 G  Catheter at skin depth: 11 cm  Test dose: 5 mL of lidocaine 1.5% with Epi 1-to-200,000  Additional Documentation: incremental injection, negative aspiration for heme and CSF, no paresthesia on injection, no signs/symptoms of IV or SA injection, no significant complaints from patient and no significant pain on injection  Needle localization: anatomical landmarks  Medications:  Bolus administered: 10 mL of 0.125% bupivacaine  Opioid administered: 100 mcg of   fentanyl  Volume per aspiration: 5 mL  Time between aspirations: 2 minutes  Assessment  Ease of block: easy  Patient's tolerance of the procedure: comfortable throughout block and no complaints

## 2017-02-23 PROBLEM — Z98.891 S/P CESAREAN SECTION: Status: ACTIVE | Noted: 2017-02-23

## 2017-02-23 LAB
ALLENS TEST: ABNORMAL
BACTERIA SPEC AEROBE CULT: NORMAL
HCO3 UR-SCNC: 23.6 MMOL/L (ref 24–28)
HIV 1+2 AB+HIV1 P24 AG SERPL QL IA: NEGATIVE
PCO2 BLDA: 51.8 MMHG (ref 35–45)
PH SMN: 7.27 [PH] (ref 7.35–7.45)
PO2 BLDA: 18 MMHG (ref 80–100)
POC BE: -3 MMOL/L
POC SATURATED O2: 20 % (ref 95–100)
RPR SER QL: NORMAL
SAMPLE: ABNORMAL
SITE: ABNORMAL

## 2017-02-23 PROCEDURE — 63600175 PHARM REV CODE 636 W HCPCS: Performed by: OBSTETRICS & GYNECOLOGY

## 2017-02-23 PROCEDURE — 25000003 PHARM REV CODE 250

## 2017-02-23 PROCEDURE — 63600175 PHARM REV CODE 636 W HCPCS: Performed by: ANESTHESIOLOGY

## 2017-02-23 PROCEDURE — 59514 CESAREAN DELIVERY ONLY: CPT | Mod: AT,,, | Performed by: OBSTETRICS & GYNECOLOGY

## 2017-02-23 PROCEDURE — 82803 BLOOD GASES ANY COMBINATION: CPT

## 2017-02-23 PROCEDURE — 25000003 PHARM REV CODE 250: Performed by: STUDENT IN AN ORGANIZED HEALTH CARE EDUCATION/TRAINING PROGRAM

## 2017-02-23 PROCEDURE — 37000008 HC ANESTHESIA 1ST 15 MINUTES: Performed by: OBSTETRICS & GYNECOLOGY

## 2017-02-23 PROCEDURE — 88307 TISSUE EXAM BY PATHOLOGIST: CPT | Performed by: PATHOLOGY

## 2017-02-23 PROCEDURE — 63600175 PHARM REV CODE 636 W HCPCS

## 2017-02-23 PROCEDURE — 72100003 HC LABOR CARE, EA. ADDL. 8 HRS

## 2017-02-23 PROCEDURE — 25000003 PHARM REV CODE 250: Performed by: OBSTETRICS & GYNECOLOGY

## 2017-02-23 PROCEDURE — 51702 INSERT TEMP BLADDER CATH: CPT

## 2017-02-23 PROCEDURE — 88307 TISSUE EXAM BY PATHOLOGIST: CPT | Mod: 26,,, | Performed by: PATHOLOGY

## 2017-02-23 PROCEDURE — 25000003 PHARM REV CODE 250: Performed by: ANESTHESIOLOGY

## 2017-02-23 PROCEDURE — 63600175 PHARM REV CODE 636 W HCPCS: Performed by: STUDENT IN AN ORGANIZED HEALTH CARE EDUCATION/TRAINING PROGRAM

## 2017-02-23 PROCEDURE — 11000001 HC ACUTE MED/SURG PRIVATE ROOM

## 2017-02-23 PROCEDURE — 37000009 HC ANESTHESIA EA ADD 15 MINS: Performed by: OBSTETRICS & GYNECOLOGY

## 2017-02-23 RX ORDER — METHYLERGONOVINE MALEATE 0.2 MG/ML
INJECTION INTRAVENOUS
Status: DISCONTINUED
Start: 2017-02-23 | End: 2017-02-23 | Stop reason: WASHOUT

## 2017-02-23 RX ORDER — SODIUM CITRATE AND CITRIC ACID MONOHYDRATE 334; 500 MG/5ML; MG/5ML
SOLUTION ORAL
Status: COMPLETED
Start: 2017-02-23 | End: 2017-02-23

## 2017-02-23 RX ORDER — METOCLOPRAMIDE HYDROCHLORIDE 5 MG/ML
INJECTION INTRAMUSCULAR; INTRAVENOUS
Status: COMPLETED
Start: 2017-02-23 | End: 2017-02-23

## 2017-02-23 RX ORDER — CARBOPROST TROMETHAMINE 250 UG/ML
250 INJECTION, SOLUTION INTRAMUSCULAR
Status: DISCONTINUED | OUTPATIENT
Start: 2017-02-23 | End: 2017-02-23

## 2017-02-23 RX ORDER — FENTANYL/BUPIVACAINE/NS/PF 2MCG/ML-.1
PLASTIC BAG, INJECTION (ML) INJECTION
Status: COMPLETED
Start: 2017-02-23 | End: 2017-02-23

## 2017-02-23 RX ORDER — METOCLOPRAMIDE HYDROCHLORIDE 5 MG/ML
10 INJECTION INTRAMUSCULAR; INTRAVENOUS ONCE
Status: DISCONTINUED | OUTPATIENT
Start: 2017-02-23 | End: 2017-02-25

## 2017-02-23 RX ORDER — SODIUM CHLORIDE, SODIUM LACTATE, POTASSIUM CHLORIDE, CALCIUM CHLORIDE 600; 310; 30; 20 MG/100ML; MG/100ML; MG/100ML; MG/100ML
INJECTION, SOLUTION INTRAVENOUS CONTINUOUS
Status: DISCONTINUED | OUTPATIENT
Start: 2017-02-23 | End: 2017-02-23

## 2017-02-23 RX ORDER — MISOPROSTOL 200 UG/1
800 TABLET ORAL
Status: DISCONTINUED | OUTPATIENT
Start: 2017-02-23 | End: 2017-02-23

## 2017-02-23 RX ORDER — BUPIVACAINE HYDROCHLORIDE 2.5 MG/ML
INJECTION, SOLUTION EPIDURAL; INFILTRATION; INTRACAUDAL
Status: DISPENSED
Start: 2017-02-23 | End: 2017-02-23

## 2017-02-23 RX ORDER — ACETAMINOPHEN 10 MG/ML
INJECTION, SOLUTION INTRAVENOUS
Status: DISCONTINUED | OUTPATIENT
Start: 2017-02-23 | End: 2017-02-23

## 2017-02-23 RX ORDER — CEFAZOLIN SODIUM 2 G/50ML
2 SOLUTION INTRAVENOUS
Status: DISCONTINUED | OUTPATIENT
Start: 2017-02-23 | End: 2017-02-23

## 2017-02-23 RX ORDER — OXYTOCIN/RINGER'S LACTATE 20/1000 ML
41.65 PLASTIC BAG, INJECTION (ML) INTRAVENOUS CONTINUOUS
Status: DISCONTINUED | OUTPATIENT
Start: 2017-02-23 | End: 2017-02-23

## 2017-02-23 RX ORDER — FAMOTIDINE 10 MG/ML
INJECTION INTRAVENOUS
Status: COMPLETED
Start: 2017-02-23 | End: 2017-02-23

## 2017-02-23 RX ORDER — LIDOCAINE HCL/EPINEPHRINE/PF 2%-1:200K
VIAL (ML) INJECTION
Status: COMPLETED
Start: 2017-02-23 | End: 2017-02-23

## 2017-02-23 RX ORDER — METHYLERGONOVINE MALEATE 0.2 MG/ML
200 INJECTION INTRAVENOUS
Status: DISCONTINUED | OUTPATIENT
Start: 2017-02-23 | End: 2017-02-23

## 2017-02-23 RX ORDER — MORPHINE SULFATE 0.5 MG/ML
INJECTION, SOLUTION EPIDURAL; INTRATHECAL; INTRAVENOUS
Status: DISCONTINUED | OUTPATIENT
Start: 2017-02-23 | End: 2017-02-23

## 2017-02-23 RX ORDER — OXYTOCIN/RINGER'S LACTATE 20/1000 ML
20 PLASTIC BAG, INJECTION (ML) INTRAVENOUS ONCE
Status: DISCONTINUED | OUTPATIENT
Start: 2017-02-23 | End: 2017-02-23

## 2017-02-23 RX ORDER — SODIUM CITRATE AND CITRIC ACID MONOHYDRATE 334; 500 MG/5ML; MG/5ML
30 SOLUTION ORAL ONCE
Status: DISCONTINUED | OUTPATIENT
Start: 2017-02-23 | End: 2017-02-25

## 2017-02-23 RX ORDER — KETOROLAC TROMETHAMINE 30 MG/ML
30 INJECTION, SOLUTION INTRAMUSCULAR; INTRAVENOUS
Status: COMPLETED | OUTPATIENT
Start: 2017-02-23 | End: 2017-02-24

## 2017-02-23 RX ORDER — OXYCODONE HYDROCHLORIDE 5 MG/1
10 TABLET ORAL EVERY 4 HOURS PRN
Status: DISCONTINUED | OUTPATIENT
Start: 2017-02-23 | End: 2017-02-27 | Stop reason: HOSPADM

## 2017-02-23 RX ORDER — FAMOTIDINE 10 MG/ML
20 INJECTION INTRAVENOUS ONCE
Status: DISCONTINUED | OUTPATIENT
Start: 2017-02-23 | End: 2017-02-25

## 2017-02-23 RX ORDER — OXYCODONE HYDROCHLORIDE 5 MG/1
5 TABLET ORAL EVERY 4 HOURS PRN
Status: DISCONTINUED | OUTPATIENT
Start: 2017-02-23 | End: 2017-02-27 | Stop reason: HOSPADM

## 2017-02-23 RX ORDER — ONDANSETRON 2 MG/ML
4 INJECTION INTRAMUSCULAR; INTRAVENOUS EVERY 8 HOURS PRN
Status: DISCONTINUED | OUTPATIENT
Start: 2017-02-23 | End: 2017-02-27 | Stop reason: HOSPADM

## 2017-02-23 RX ORDER — MISOPROSTOL 200 UG/1
TABLET ORAL
Status: DISCONTINUED
Start: 2017-02-23 | End: 2017-02-23 | Stop reason: WASHOUT

## 2017-02-23 RX ORDER — FENTANYL CITRATE 50 UG/ML
INJECTION, SOLUTION INTRAMUSCULAR; INTRAVENOUS
Status: DISPENSED
Start: 2017-02-23 | End: 2017-02-23

## 2017-02-23 RX ORDER — CARBOPROST TROMETHAMINE 250 UG/ML
INJECTION, SOLUTION INTRAMUSCULAR
Status: DISCONTINUED
Start: 2017-02-23 | End: 2017-02-23 | Stop reason: WASHOUT

## 2017-02-23 RX ORDER — OXYTOCIN 10 [USP'U]/ML
INJECTION, SOLUTION INTRAMUSCULAR; INTRAVENOUS
Status: DISCONTINUED | OUTPATIENT
Start: 2017-02-23 | End: 2017-02-23

## 2017-02-23 RX ORDER — KETOROLAC TROMETHAMINE 30 MG/ML
INJECTION, SOLUTION INTRAMUSCULAR; INTRAVENOUS
Status: DISCONTINUED | OUTPATIENT
Start: 2017-02-23 | End: 2017-02-23

## 2017-02-23 RX ORDER — SODIUM CITRATE AND CITRIC ACID MONOHYDRATE 334; 500 MG/5ML; MG/5ML
30 SOLUTION ORAL ONCE
Status: COMPLETED | OUTPATIENT
Start: 2017-02-23 | End: 2017-02-23

## 2017-02-23 RX ORDER — ACETAMINOPHEN 325 MG/1
650 TABLET ORAL
Status: COMPLETED | OUTPATIENT
Start: 2017-02-23 | End: 2017-02-24

## 2017-02-23 RX ORDER — LIDOCAINE HYDROCHLORIDE 10 MG/ML
INJECTION INFILTRATION; PERINEURAL
Status: DISCONTINUED
Start: 2017-02-23 | End: 2017-02-23 | Stop reason: WASHOUT

## 2017-02-23 RX ORDER — FENTANYL CITRATE 50 UG/ML
INJECTION, SOLUTION INTRAMUSCULAR; INTRAVENOUS
Status: DISCONTINUED | OUTPATIENT
Start: 2017-02-23 | End: 2017-02-23

## 2017-02-23 RX ORDER — MISOPROSTOL 200 UG/1
600 TABLET ORAL
Status: DISCONTINUED | OUTPATIENT
Start: 2017-02-23 | End: 2017-02-23

## 2017-02-23 RX ADMIN — FENTANYL CITRATE 100 MCG: 50 INJECTION, SOLUTION INTRAMUSCULAR; INTRAVENOUS at 03:02

## 2017-02-23 RX ADMIN — SODIUM CHLORIDE, SODIUM LACTATE, POTASSIUM CHLORIDE, AND CALCIUM CHLORIDE: 600; 310; 30; 20 INJECTION, SOLUTION INTRAVENOUS at 04:02

## 2017-02-23 RX ADMIN — Medication 1.5 MG: at 04:02

## 2017-02-23 RX ADMIN — METOCLOPRAMIDE 10 MG: 5 INJECTION, SOLUTION INTRAMUSCULAR; INTRAVENOUS at 04:02

## 2017-02-23 RX ADMIN — OXYTOCIN 2 UNITS: 10 INJECTION, SOLUTION INTRAMUSCULAR; INTRAVENOUS at 04:02

## 2017-02-23 RX ADMIN — KETOROLAC TROMETHAMINE 30 MG: 30 INJECTION, SOLUTION INTRAMUSCULAR; INTRAVENOUS at 05:02

## 2017-02-23 RX ADMIN — AZITHROMYCIN MONOHYDRATE 250 ML: 500 INJECTION, POWDER, LYOPHILIZED, FOR SOLUTION INTRAVENOUS at 04:02

## 2017-02-23 RX ADMIN — LIDOCAINE HYDROCHLORIDE,EPINEPHRINE BITARTRATE 5 ML: 20; .005 INJECTION, SOLUTION EPIDURAL; INFILTRATION; INTRACAUDAL; PERINEURAL at 04:02

## 2017-02-23 RX ADMIN — Medication: at 06:02

## 2017-02-23 RX ADMIN — ACETAMINOPHEN 650 MG: 325 TABLET ORAL at 11:02

## 2017-02-23 RX ADMIN — SODIUM CITRATE AND CITRIC ACID MONOHYDRATE 30 ML: 500; 334 SOLUTION ORAL at 04:02

## 2017-02-23 RX ADMIN — Medication 11 MILLI-UNITS/MIN: at 12:02

## 2017-02-23 RX ADMIN — FAMOTIDINE 20 MG: 10 INJECTION INTRAVENOUS at 04:02

## 2017-02-23 RX ADMIN — OXYTOCIN 2 UNITS: 10 INJECTION, SOLUTION INTRAMUSCULAR; INTRAVENOUS at 05:02

## 2017-02-23 RX ADMIN — FENTANYL CITRATE 100 MCG: 50 INJECTION, SOLUTION INTRAMUSCULAR; INTRAVENOUS at 04:02

## 2017-02-23 RX ADMIN — BUPIVACAINE HYDROCHLORIDE 5 ML: 2.5 INJECTION, SOLUTION EPIDURAL; INFILTRATION; INTRACAUDAL; PERINEURAL at 03:02

## 2017-02-23 RX ADMIN — Medication 10 ML/HR: at 12:02

## 2017-02-23 RX ADMIN — ACETAMINOPHEN 1000 MG: 10 INJECTION, SOLUTION INTRAVENOUS at 04:02

## 2017-02-23 RX ADMIN — SODIUM CHLORIDE, SODIUM LACTATE, POTASSIUM CHLORIDE, AND CALCIUM CHLORIDE: 600; 310; 30; 20 INJECTION, SOLUTION INTRAVENOUS at 12:02

## 2017-02-23 RX ADMIN — SODIUM CITRATE AND CITRIC ACID MONOHYDRATE 30 ML: 334; 500 SOLUTION ORAL at 04:02

## 2017-02-23 RX ADMIN — KETOROLAC TROMETHAMINE 30 MG: 30 INJECTION, SOLUTION INTRAMUSCULAR at 11:02

## 2017-02-23 NOTE — PROGRESS NOTES
"LABOR NOTE    S:  MD to bedside for cervical check. Complaints: No.  Epidural working:  Yes. Feeling pressure during checks. Anesthesia tested her level and is near nipple line.     O:   Visit Vitals    BP (!) 96/52    Pulse 84    Temp 98.2 °F (36.8 °C) (Temporal)    Resp 16    Ht 5' 6" (1.676 m)    Wt 98.9 kg (218 lb)    LMP 2016    SpO2 99%    Breastfeeding No    BMI 35.19 kg/m2     FHT: Cat 1 (reassuring), 125, mod btbv, + accels, no decels  CTX: q 1-3 minutes  SVE: 4/80/-2    IUPC placed    ASSESSMENT:   36 y.o.  at 35w3d, labor induction due to PROM    FHT reassuring    Active Hospital Problems    Diagnosis  POA     premature rupture of membranes (PPROM) delivered, current hospitalization [O42.919]  Yes      Resolved Hospital Problems    Diagnosis Date Resolved POA   No resolved problems to display.     PLAN:  Continue Close Maternal/Fetal Monitoring  Continue Pitocin augmentation per protocol  Plan for second dose late- BMZ tomorrow afternoon if still pregnant  Recheck 2 hours or PRN    Sam Davis MD  PGY 4 - Ob/Gyn  "

## 2017-02-23 NOTE — PROGRESS NOTES
"LABOR NOTE    S:  MD to bedside for cervical check. Complaints: No.  Epidural working:  yes    O:   Visit Vitals    /83    Pulse 78    Temp 98.2 °F (36.8 °C) (Temporal)    Resp 16    Ht 5' 6" (1.676 m)    Wt 98.9 kg (218 lb)    LMP 2016    SpO2 98%    Breastfeeding No    BMI 35.19 kg/m2     FHT: Cat 1 (reassuring)  CTX: q 3-5 minutes  SVE: 3/80/-2      ASSESSMENT:   36 y.o.  at 35w3d, labor induction due to PROM    FHT reassuring    Active Hospital Problems    Diagnosis  POA     premature rupture of membranes (PPROM) delivered, current hospitalization [O42.919]  Yes      Resolved Hospital Problems    Diagnosis Date Resolved POA   No resolved problems to display.       PLAN:  Continue Close Maternal/Fetal Monitoring  Initiate Pitocin Augmentation per protocol, at 10 currently  Plan for second dose late- BMZ tomorrow afternoon if still pregnant  Recheck 2 hours or PRN    Xiang Shaikh MD  OB/GYN PGY-1  Pager: [phone]      "

## 2017-02-23 NOTE — PROGRESS NOTES
LABOR PROGRESS NOTE    S:  MD to bedside for cervical exam. Complaints: No.  Epidural in place and working    O: Temp:  [96.4 °F (35.8 °C)-98.2 °F (36.8 °C)] 96.4 °F (35.8 °C)  Pulse:  [] 89  Resp:  [16-18] 17  SpO2:  [92 %-100 %] 99 %  BP: ()/(52-84) 121/62    FHT: 110 BPM/moderate beat to beat variability/+accels/-decels Cat 1 (reassuring)  CTX: q 4 minutes, pitocin 15mU  SVE: /-1    ASSESSMENT:   36 y.o.  at 35w4d, here for PPROM    FHT reassuring    Active Hospital Problems    Diagnosis  POA     premature rupture of membranes (PPROM) delivered, current hospitalization [O42.919]  Yes      Resolved Hospital Problems    Diagnosis Date Resolved POA   No resolved problems to display.     PLAN:    Labor management  Continue Close Maternal/Fetal Monitoring.   Pitocin Augmentation per protocol, 15mU  Recheck 2 hours or PRN  IUPC is in place    PPROM  BMZ 2/2 due ~ 1700 if not yet delivered    Tootie Fletcher MD, PhD  OBGYN, PGY-1

## 2017-02-23 NOTE — L&D DELIVERY NOTE
OPERATIVE NOTE      SECTION    DATE OF PROCEDURE: 2017    PREOPERATIVE DIAGNOSES:   1. PPROM @ 35.4wga  2. Failure to progress (6cm x 9.5h)      POSTOPERATIVE DIAGNOSES:   Same  3. S/p 1LTCS     SURGEON : Sekou Sims MD    ASSISTANT: Heidy Arenas    PROCEDURE: Primary low transverse  section via Pfannenstiel incision.     ANESTHESIA: Epidural anesthesia    ESTIMATED BLOOD LOSS: 690cc    UOP: 160cc    FLUIDS: 2715cc    INDICATIONS: Ms. Holt is a 36 yo female with IUP 35w4d weeks' gestational age who was admitted for PPROM. After discussion of r/b/i/a, the decision was made to proceed with 1LTCS 2/2 arrest of dilation at 6cm x 9.5hours.    FINDINGS: male infant in cephalic presentation. Infant weighing 2608g with Apgars of 6 and 7 and 9 at 1 and 5 and 10 minutes respectively. Normal uterus, tubes and ovaries.     PROCEDURE IN DETAIL: The patient was taken back to the Operating Room where anesthesia was found to be adequate. The patient was prepped and draped in normal sterile fashion in dorsal supine position with leftward tilt.  Skin incision was made with scalpel and this incision was taken down to the underlying fascia with the bovie. Incision was made in the midline of the fascia with inside knife; the incision was extended laterally using curved Womack scissors on both sides. Using Kocher clamps, the superior aspect of the fascia was grasped and tented up and the underlying rectus muscle was  off the fascia bluntly with the assistance of the bovie. In a similar fashion, the inferior aspect of the fascia was grasped with Kocher's, tented up and the underlying rectus muscle was  off bluntly with the assistance of curved Womack scissors. The muscle was then  bluntly in the midline. Peritoneum was identified, tented up with hemostats and entered sharply with Metzenbaum scissors and then extended superiorly and inferiorly bluntly. Bladder blade was  inserted and the lower uterine segment identified. Bilateral rectus muscles were  with the bovie approximately 1cm laterally. The vesicouterine peritoneum was then identified, grasped with Anguillan's and entered sharply with the Metzenbaum. The incision was extended laterally bilaterally. The vesicouterine peritoneum was tented up with Kavita's and bluntly  from the uterus. The bladder blade was then reinserted and a low transverse incision was made on the uterus with a scalpel. The amniotic sac was ruptured bluntly. The infant was found to be in a cephalic presentation. The head was disengaged from the pelvis. The head was then delivered atraumatically and the infant shortly followed. The cord was clamped and cut and the infant was suctioned with bulb and handed off to the awaiting NICU team. Cord gas and blood were collected and sent. Right inferior uterine extension noted. The placenta was then delivered spontaneously. The uterus was then exteriorized and cleared of all debris and clots. The hysterotomy was then closed using #1 chromic in a running locked fashion, starting from the right extension. Good hemostasis was noted at both the angles and at the repaired hysterotomy incision after a single figure of eight suture was placed behind the right angle. The uterus was then replaced into the abdomen and the gutters were irrigated and cleared of all clots. The peritoneum was then reapproximated with 2-0 vicryl. The muscle was then reapproximated using #1 chromic with interrupted sutures and the incisions made laterally in each rectus muscle were also reapproximated vertically using #1 chromic. The fascia was then reapproximated using 0 PDS in a running fashion and tied at the midline. Good hemostasis was noted throughout. The subcutaneous tissue was reapproximated using 2-0 vicryl in an interrupted fashion. The skin was then closed using 4-0 Monocryl in a running fashion.   Sponge, lap, and needle  counts were good x 2. The patient tolerated the procedure well and was transferred over to the recovery area in stable condition.    Delivery Information for  Jose Holt    Birth information:  YOB: 2017   Time of birth: 4:48 PM   Sex: male   Head Delivery Date/Time: 2017  4:48 PM   Delivery type: , Low Transverse   Gestational Age: 35w4d    Delivery Providers    Delivering clinician:  LORENA BANERJEE   Other personnel:   Provider Role   BRO MILLER Resident   RHETT SCOTT Surgical Tech   THEO CANO Registered Nurse   MACHELLE PINZON Registered Nurse                   Measurements    Weight:  2608 g Length:  48.3 cm   Head circum.:  31.8 cm Chest circum.:  30.5 cm           Assessment    Living status:  Yes   Apgars    1 Minute:   5 Minute:   10 Minute 15 Minute 20 Minute   Skin Color: 0  1  1      Heart Rate: 2  2  2      Reflex Irritability: 1  1  2      Muscle Tone: 2  1  2      Respiratory Effort: 1  2  2      Total: 6  7  9                 Apgars Assigned By:  NICU          Assisted Delivery Details:    Forceps attempted?:  No   Vacuum extractor attempted?:  No             Shoulder Dystocia    Shoulder dystocia present?:  No                                             Presentation and Position    Presentation: Vertex   Position:                    Interventions/Resuscitation    Method:  NICU Attended        Cord    Vessels:  3 vessels   Complications:  None   Delayed Cord Clamping?:  No   Cord Clamped Date/Time:  2017  4:48 PM   Cord Blood Disposition:  Sent with Baby   Gases Sent?:  Yes   Stem Cell Collection (by MD):  No        Placenta    Date and time:  2017  4:49 PM   Removal:  Manual removal   Appearance:  Intact   Placenta disposition:  Pathology            Labor Events:       labor: Yes     Labor Onset Date/Time:         Dilation Complete Date/Time:         Start Pushing Date/Time:       Rupture Date/Time:  17  1300         Rupture type: spontaneous rupture of membranes         Fluid Amount: moderate      Fluid Color: clear      Fluid Odor:        Membrane Status (PeriCalm):        Rupture Date/Time (PeriCalm):        Fluid Amount (PeriCalm):        Fluid Color (PeriCalm):         steroids: Partial Course     Antibiotics given for GBS: No     Induction: misoprostol     Indications for induction:  Premature ROM     Augmentation: oxytocin     Indications for augmentation: Ineffective Contraction Pattern     Labor complications: Failure to Progress in Second Stage     Additional complications:          Cervical ripening:                     Delivery:      Episiotomy:       Indication for Episiotomy:       Perineal Lacerations:   Repaired:      Periurethral Laceration:   Repaired:     Labial Laceration:   Repaired:     Sulcus Laceration:   Repaired:     Vaginal Laceration:   Repaired:     Cervical Laceration:   Repaired:     Repair suture:       Repair # of packets:       Blood loss (ml): 690     Vaginal Sweep Performed:       Surgicount Correct:         Other providers:       Anesthesia    Method:  Epidural              Details (if applicable):  Trial of Labor Yes    Categorization: Primary    Priority: Routine   Indications for : Failure to Progress   Incision Type: Low Transverse     Additional  information:  Forceps:    Vacuum:    Breech:    Observed anomalies    Other (Comments):         Heidy Arenas MD  PGY-2 OB/GYN  [phone]

## 2017-02-23 NOTE — PROGRESS NOTES
LABOR PROGRESS NOTE    S:  MD to bedside for cervical exam. Complaints: No.  Epidural in place and working    O: Temp:  [96.1 °F (35.6 °C)-98.2 °F (36.8 °C)] 96.1 °F (35.6 °C)  Pulse:  [] 76  Resp:  [16-18] 17  SpO2:  [92 %-100 %] 99 %  BP: ()/(52-87) 108/66    FHT: 100 BPM/moderate beat to beat variability/+accels/-decels Cat 1 (reassuring)  CTX: q 4 minutes, pitocin 15mU  SVE: 6/80/-1, anterior lip is edematous; no cervical change since 0600 this AM    ASSESSMENT:   36 y.o.  at 35w4d, here for PPROM    FHT reassuring    Active Hospital Problems    Diagnosis  POA     premature rupture of membranes (PPROM) delivered, current hospitalization [O42.919]  Yes      Resolved Hospital Problems    Diagnosis Date Resolved POA   No resolved problems to display.     PLAN:    Labor management  Continue Close Maternal/Fetal Monitoring.   Pitocin Augmentation per protocol, 15mU  Recheck 1 hour or PRN  IUPC is in place  To OR for LTCS if no cervical change in 1 hour    PPROM  BMZ 2/2 due ~ 1700 if not yet delivered    Tootie Fletcher MD, PhD  OBGYN, PGY-1

## 2017-02-23 NOTE — PROGRESS NOTES
"LABOR NOTE    S:  MD to bedside for cervical check. Complaints: No.  Epidural working:  Yes. Feeling pressure during checks. Anesthesia tested her level and is near nipple line.     O:   Visit Vitals    /68    Pulse 87    Temp 98.2 °F (36.8 °C) (Temporal)    Resp 16    Ht 5' 6" (1.676 m)    Wt 98.9 kg (218 lb)    LMP 2016    SpO2 100%    Breastfeeding No    BMI 35.19 kg/m2     FHT: Cat 1 (reassuring), 125, mod btbv, no accels, no decels  CTX: q 1-3 minutes  SVE: /-2      ASSESSMENT:   36 y.o.  at 35w3d, labor induction due to PROM    FHT reassuring    Active Hospital Problems    Diagnosis  POA     premature rupture of membranes (PPROM) delivered, current hospitalization [O42.919]  Yes      Resolved Hospital Problems    Diagnosis Date Resolved POA   No resolved problems to display.       PLAN:  Continue Close Maternal/Fetal Monitoring  Initiate Pitocin Augmentation per protocol, at 11 currently  Plan for second dose late- BMZ tomorrow afternoon if still pregnant  Recheck 2 hours or PRN    Xiang Shaikh MD  OB/GYN PGY-1  Pager: [phone]      "

## 2017-02-23 NOTE — PROGRESS NOTES
"LABOR NOTE    S:  Complaints: No.  Epidural working:  yes    O:   Visit Vitals    /77    Pulse 95    Temp 97 °F (36.1 °C) (Temporal)    Resp 16    Ht 5' 6" (1.676 m)    Wt 98.9 kg (218 lb)    LMP 2016    SpO2 99%    Breastfeeding No    BMI 35.19 kg/m2     FHT: Cat 1 (reassuring)  CTX: q 3-5 minutes  SVE: 2/80/-2, soft anterior      ASSESSMENT:   36 y.o.  at 35w3d, labor induction due to PROM    FHT reassuring    Active Hospital Problems    Diagnosis  POA     premature rupture of membranes (PPROM) delivered, current hospitalization [O42.919]  Yes      Resolved Hospital Problems    Diagnosis Date Resolved POA   No resolved problems to display.       PLAN:  Continue Close Maternal/Fetal Monitoring  Initiate Pitocin Augmentation per protocol  Plan for second dose late- BMZ tomorrow afternoon if still pregnant  Recheck 2 hours or PRN    Sam Davis MD  PGY 4 - Ob/Gyn      "

## 2017-02-23 NOTE — PROGRESS NOTES
"LABOR NOTE    S:  MD to bedside for cervical check. Complaints: No.  Epidural working:  Yes.     O:   Visit Vitals    BP (!) 106/57    Pulse 74    Temp 96.4 °F (35.8 °C) (Temporal)    Resp 17    Ht 5' 6" (1.676 m)    Wt 98.9 kg (218 lb)    LMP 2016    SpO2 100%    Breastfeeding No    BMI 35.19 kg/m2     FHT: Cat 1 (reassuring), 115, mod btbv, + accels, no decels  CTX: q 2-4 minutes, coupling  SVE: /-1    IUPC in place    ASSESSMENT:   36 y.o.  at 35w3d, labor induction due to PROM    FHT reassuring    Active Hospital Problems    Diagnosis  POA     premature rupture of membranes (PPROM) delivered, current hospitalization [O42.919]  Yes      Resolved Hospital Problems    Diagnosis Date Resolved POA   No resolved problems to display.     PLAN:  Continue Close Maternal/Fetal Monitoring  Continue Pitocin augmentation per protocol  Plan for second dose late- BMZ tomorrow afternoon if still pregnant  Recheck 2 hours or PRN    Irish العراقي MD  Ob/Gyn PGY-1  Pager # [phone]    "

## 2017-02-23 NOTE — ANESTHESIA POSTPROCEDURE EVALUATION
"Anesthesia Post Evaluation    Patient: Dao Holt    Procedure(s) Performed: Procedure(s) (LRB):  DELIVERY- SECTION (N/A)    Final Anesthesia Type: CSE  Patient location during evaluation: PACU  Patient participation: Yes- Able to Participate  Level of consciousness: awake and alert  Post-procedure vital signs: reviewed and stable  Pain management: adequate  Airway patency: patent  PONV status at discharge: No PONV  Anesthetic complications: no      Cardiovascular status: hemodynamically stable and blood pressure returned to baseline  Respiratory status: unassisted, spontaneous ventilation and room air  Hydration status: euvolemic  Follow-up not needed.        Visit Vitals    /63    Pulse 93    Temp 36.6 °C (97.9 °F) (Axillary)    Resp 17    Ht 5' 6" (1.676 m)    Wt 98.9 kg (218 lb)    LMP 2016    SpO2 100%    Breastfeeding No    BMI 35.19 kg/m2       Pain/Christina Score: Pain Assessment Performed: Yes (2017  4:56 PM)  Presence of Pain: denies (2017  6:33 PM)  Pain Rating Prior to Med Admin: 10 (2017  9:17 AM)      "

## 2017-02-23 NOTE — PROGRESS NOTES
LABOR PROGRESS NOTE    S:  MD with Dr. Sims to bedside for cervical exam. Complaints: No.  Epidural in place and working    O: Temp:  [96.1 °F (35.6 °C)-98.2 °F (36.8 °C)] 97.6 °F (36.4 °C)  Pulse:  [] 80  Resp:  [16-18] 17  SpO2:  [92 %-100 %] 98 %  BP: ()/(52-87) 120/76    FHT: 110 BPM/moderate beat to beat variability/+accels/-decels Cat 1 (reassuring)  CTX: q 4 minutes, pitocin 15mU  SVE: 6/80/-1, anterior lip is edematous; no cervical change since 0600 this AM    ASSESSMENT:   36 y.o.  at 35w4d, here for PPROM    FHT reassuring    Active Hospital Problems    Diagnosis  POA     premature rupture of membranes (PPROM) delivered, current hospitalization [O42.919]  Yes      Resolved Hospital Problems    Diagnosis Date Resolved POA   No resolved problems to display.     PLAN:    Labor management  Continue Close Maternal/Fetal Monitoring.   Pitocin Augmentation per protocol, 22mU  Contractions previously adequate are no longer adequate  Pitocin rest for 20 minutes  Restart at 11mU  Recheck 1 hours or PRN  IUPC is in place  If no change in 1 hour will discuss 1LTCS for arrest of dilation at 6 cm    PPROM  BMZ 2/2 due ~ 1700 if not yet delivered    Tootie Fletcher MD, PhD  OBGYN, PGY-1

## 2017-02-23 NOTE — PROGRESS NOTES
MD to bedside for deceleration approximately 4min.  Contractions increased in frequency during this time.  On recheck cervix unchanged, 6/80/-1. FHTs resolved with maternal repositioning and O2.    bpm, mod variability, +accel, no dec, CTX 2-4 min   Pitocin at 15  Continue to monitor      Irish العراقي MD  Ob/Gyn PGY-1  Pager # [phone]

## 2017-02-24 LAB
BASOPHILS # BLD AUTO: ABNORMAL K/UL
BASOPHILS NFR BLD: 0 %
DIFFERENTIAL METHOD: ABNORMAL
EOSINOPHIL # BLD AUTO: ABNORMAL K/UL
EOSINOPHIL NFR BLD: 1 %
ERYTHROCYTE [DISTWIDTH] IN BLOOD BY AUTOMATED COUNT: 12.7 %
HCT VFR BLD AUTO: 32.5 %
HGB BLD-MCNC: 11.1 G/DL
LYMPHOCYTES # BLD AUTO: ABNORMAL K/UL
LYMPHOCYTES NFR BLD: 11 %
MCH RBC QN AUTO: 30.8 PG
MCHC RBC AUTO-ENTMCNC: 34.2 %
MCV RBC AUTO: 90 FL
MONOCYTES # BLD AUTO: ABNORMAL K/UL
MONOCYTES NFR BLD: 7 %
MYELOCYTES NFR BLD MANUAL: 1 %
NEUTROPHILS NFR BLD: 62 %
NEUTS BAND NFR BLD MANUAL: 18 %
PLATELET # BLD AUTO: 264 K/UL
PLATELET BLD QL SMEAR: ABNORMAL
PMV BLD AUTO: 8.9 FL
RBC # BLD AUTO: 3.6 M/UL
WBC # BLD AUTO: 21.82 K/UL

## 2017-02-24 PROCEDURE — 25000003 PHARM REV CODE 250: Performed by: STUDENT IN AN ORGANIZED HEALTH CARE EDUCATION/TRAINING PROGRAM

## 2017-02-24 PROCEDURE — 85007 BL SMEAR W/DIFF WBC COUNT: CPT

## 2017-02-24 PROCEDURE — 36415 COLL VENOUS BLD VENIPUNCTURE: CPT

## 2017-02-24 PROCEDURE — 63600175 PHARM REV CODE 636 W HCPCS: Performed by: ANESTHESIOLOGY

## 2017-02-24 PROCEDURE — 11000001 HC ACUTE MED/SURG PRIVATE ROOM

## 2017-02-24 PROCEDURE — 25000003 PHARM REV CODE 250: Performed by: ANESTHESIOLOGY

## 2017-02-24 PROCEDURE — 85027 COMPLETE CBC AUTOMATED: CPT

## 2017-02-24 RX ORDER — BISACODYL 10 MG
10 SUPPOSITORY, RECTAL RECTAL ONCE AS NEEDED
Status: ACTIVE | OUTPATIENT
Start: 2017-02-24 | End: 2017-02-24

## 2017-02-24 RX ORDER — DIPHENHYDRAMINE HCL 25 MG
25 CAPSULE ORAL EVERY 4 HOURS PRN
Status: DISCONTINUED | OUTPATIENT
Start: 2017-02-24 | End: 2017-02-27 | Stop reason: HOSPADM

## 2017-02-24 RX ORDER — OXYTOCIN/RINGER'S LACTATE 20/1000 ML
41.65 PLASTIC BAG, INJECTION (ML) INTRAVENOUS CONTINUOUS
Status: ACTIVE | OUTPATIENT
Start: 2017-02-24 | End: 2017-02-24

## 2017-02-24 RX ORDER — SODIUM CHLORIDE, SODIUM LACTATE, POTASSIUM CHLORIDE, CALCIUM CHLORIDE 600; 310; 30; 20 MG/100ML; MG/100ML; MG/100ML; MG/100ML
INJECTION, SOLUTION INTRAVENOUS CONTINUOUS
Status: ACTIVE | OUTPATIENT
Start: 2017-02-24 | End: 2017-02-24

## 2017-02-24 RX ORDER — SIMETHICONE 80 MG
1 TABLET,CHEWABLE ORAL EVERY 6 HOURS PRN
Status: DISCONTINUED | OUTPATIENT
Start: 2017-02-24 | End: 2017-02-27 | Stop reason: HOSPADM

## 2017-02-24 RX ORDER — ZOLPIDEM TARTRATE 5 MG/1
5 TABLET ORAL NIGHTLY PRN
Status: DISCONTINUED | OUTPATIENT
Start: 2017-02-24 | End: 2017-02-27 | Stop reason: HOSPADM

## 2017-02-24 RX ORDER — HYDROCORTISONE 25 MG/G
CREAM TOPICAL 3 TIMES DAILY PRN
Status: DISCONTINUED | OUTPATIENT
Start: 2017-02-24 | End: 2017-02-27 | Stop reason: HOSPADM

## 2017-02-24 RX ORDER — ADHESIVE BANDAGE
30 BANDAGE TOPICAL 2 TIMES DAILY PRN
Status: DISCONTINUED | OUTPATIENT
Start: 2017-02-24 | End: 2017-02-27 | Stop reason: HOSPADM

## 2017-02-24 RX ORDER — NAPROXEN 500 MG/1
500 TABLET ORAL EVERY 8 HOURS PRN
Status: DISCONTINUED | OUTPATIENT
Start: 2017-02-24 | End: 2017-02-27

## 2017-02-24 RX ORDER — IBUPROFEN 600 MG/1
600 TABLET ORAL 3 TIMES DAILY
Qty: 30 TABLET | Refills: 2 | Status: SHIPPED | OUTPATIENT
Start: 2017-02-24 | End: 2017-03-21

## 2017-02-24 RX ORDER — AMOXICILLIN 250 MG
1 CAPSULE ORAL NIGHTLY PRN
Status: DISCONTINUED | OUTPATIENT
Start: 2017-02-24 | End: 2017-02-27 | Stop reason: HOSPADM

## 2017-02-24 RX ORDER — ONDANSETRON 8 MG/1
8 TABLET, ORALLY DISINTEGRATING ORAL EVERY 8 HOURS PRN
Status: DISCONTINUED | OUTPATIENT
Start: 2017-02-24 | End: 2017-02-27 | Stop reason: HOSPADM

## 2017-02-24 RX ORDER — DOCUSATE SODIUM 100 MG/1
200 CAPSULE, LIQUID FILLED ORAL 2 TIMES DAILY
Status: DISCONTINUED | OUTPATIENT
Start: 2017-02-24 | End: 2017-02-27 | Stop reason: HOSPADM

## 2017-02-24 RX ORDER — OXYCODONE AND ACETAMINOPHEN 5; 325 MG/1; MG/1
1 TABLET ORAL EVERY 4 HOURS PRN
Qty: 20 TABLET | Refills: 0 | Status: SHIPPED | OUTPATIENT
Start: 2017-02-24 | End: 2017-03-21

## 2017-02-24 RX ORDER — OXYCODONE AND ACETAMINOPHEN 10; 325 MG/1; MG/1
1 TABLET ORAL EVERY 4 HOURS PRN
Status: DISCONTINUED | OUTPATIENT
Start: 2017-02-24 | End: 2017-02-27 | Stop reason: HOSPADM

## 2017-02-24 RX ORDER — OXYCODONE AND ACETAMINOPHEN 5; 325 MG/1; MG/1
1 TABLET ORAL EVERY 4 HOURS PRN
Status: DISCONTINUED | OUTPATIENT
Start: 2017-02-24 | End: 2017-02-27 | Stop reason: HOSPADM

## 2017-02-24 RX ADMIN — NAPROXEN 500 MG: 500 TABLET ORAL at 11:02

## 2017-02-24 RX ADMIN — DOCUSATE SODIUM 200 MG: 100 CAPSULE, LIQUID FILLED ORAL at 08:02

## 2017-02-24 RX ADMIN — ACETAMINOPHEN 650 MG: 325 TABLET ORAL at 12:02

## 2017-02-24 RX ADMIN — DOCUSATE SODIUM 200 MG: 100 CAPSULE, LIQUID FILLED ORAL at 11:02

## 2017-02-24 RX ADMIN — ACETAMINOPHEN 650 MG: 325 TABLET ORAL at 05:02

## 2017-02-24 RX ADMIN — KETOROLAC TROMETHAMINE 30 MG: 30 INJECTION, SOLUTION INTRAMUSCULAR at 05:02

## 2017-02-24 RX ADMIN — KETOROLAC TROMETHAMINE 30 MG: 30 INJECTION, SOLUTION INTRAMUSCULAR at 12:02

## 2017-02-24 RX ADMIN — OXYCODONE HYDROCHLORIDE 5 MG: 5 TABLET ORAL at 07:02

## 2017-02-24 RX ADMIN — ACETAMINOPHEN 650 MG: 325 TABLET ORAL at 07:02

## 2017-02-24 NOTE — LACTATION NOTE
Praised patient for her desire to breastfeed; requested she call lactation for assistance with breastfeeding; advised on breastfeeding baby of 35 5/7 weeks; offered to initiate use of breastpump for breast stimulation pc; patient declined at this time; wrote this consultant's spectralink  # on her dry erase board and requested she call;

## 2017-02-24 NOTE — PROGRESS NOTES
POSTPARTUM PROGRESS NOTE     Dao Holt is a 36 y.o. female POD #1 status post Primary  section at 35w5d in a pregnancy complicated by PPROM, failure to progress, sickle cell trait. Patient is doing well this morning. She denies nausea, vomiting, fever or chills.  Patient reports mild abdominal pain that is adequately relieved by oral pain medications. Lochia is mild to moderate  and stable. Patient's pinedo removed 1hr prior to speaking with her, she has yet to void spontaneously or ambulate. She has passed flatus, and has not had BM.  Patient does plan to breast feed. Did not discuss contraception. She desires circumcision.    Objective:       Temp:  [96.1 °F (35.6 °C)-99.6 °F (37.6 °C)] 99.6 °F (37.6 °C)  Pulse:  [] 117  Resp:  [16-18] 18  SpO2:  [96 %-100 %] 99 %  BP: (102-132)/(53-87) 121/74    General:   alert, appears stated age and cooperative   Lungs:   clear to auscultation bilaterally   Heart:   regular rate and rhythm, S1, S2 normal, no murmur, click, rub or gallop   Abdomen:  soft, non-tender; bowel sounds normal; no masses,  no organomegaly   Uterus:  firm located at the umblicus.        Incision: Bandage in place, clean, dry and intact   Extremities: peripheral pulses normal, no pedal edema, no clubbing or cyanosis     Lab Review  No results found for this or any previous visit (from the past 4 hour(s)).    I/O    Intake/Output Summary (Last 24 hours) at 17 0709  Last data filed at 17 0517   Gross per 24 hour   Intake          2509.93 ml   Output             1460 ml   Net          1049.93 ml        Assessment:     Patient Active Problem List   Diagnosis    Elderly multigravida in second trimester    Encounter for supervision of normal first pregnancy in second trimester    Sickle cell trait     premature rupture of membranes (PPROM) delivered, current hospitalization    S/P  section        Plan:   1. Postpartum care:  - Patient doing well. Continue  routine management and advances.  - Continue PO pain meds. Pain well controlled.  - Heme: H/h 12/36 --> p  - Encourage ambulation  - Circumcision desired, order and consent complete  - Contraception to be discussed PP  - Lactation consult PRN            Dispo: As patient meets milestones, will plan to discharge POD2-4.    Irish العراقي

## 2017-02-24 NOTE — PLAN OF CARE
Problem: Patient Care Overview  Goal: Plan of Care Review  Outcome: Ongoing (interventions implemented as appropriate)  Preparation and Hygiene:      1. Shower daily.  2. Wear a clean bra each day and wash daily in warm soapy water.  3. Change wet or moist breast pads frequently.  Moist pads can promote growth of germs.  4. Actively wash your hands, paying close attention to the area around and under your fingernails, thoroughly with soap and water for 15 seconds before pumping or handling your milk.  Re-wash your hands if you touch anything (scratching your nose, answering the phone, etc) while pumping or handling your milk.  5. Before pumping your breasts, assemble the pump collection kit and have ready the sterile container and labels.  Place these items on a clean surface next to the breastpump.  6. Each time after you have finished pumping, take apart all of the parts of the breastpump collection kit and place them in a separate cleaning container (do not place them in the sink).  Be sure to remove the yellow valve from the breastshield and separate the white membrane from the yellow valve.  Rinse all of these parts with cool water.  Then use a new sponge and/or bottle brush and dishwashing detergent to clean the parts.  Rinse off the soapy water with cool water and air dry on a clean towel covered with a clean cloth. All parts may also be washed after each use in the top rack of a .  7. Once each day, sterilize all of the parts of the breastpump collection kit.  This can be done by boiling the kit parts for 10 minutes or by using a Quick Clean Micro-Steam Bag made by [employer]  8. If condensation appears in the tubing, continue to run the pump with the tubing attached for 1-2 minutes or until the tubing is dry.  9. Notify your babys nurse or doctor if you become ill or need to take any medication, even over-the-counter medicines.           Collection and Storage of Expressed Breastmilk:           1.       Pump your breasts at least 8-10 times every 24 hours.  Double pump (both breasts at  the same time) for at least 15-20 minutes using the most suction that is comfortable.    2.        Write the date and time of pumping and the name of any medications you are takingon the babys pre-printed hospital identification label.   3.        Place your babys pre-printed hospital identification label on each container of breastmilk.  Additional pre-printed labels can be obtained from your babys nurse.  If your expressed breastmilk is not correctly labeled, the nurse cannot feed the milk to the baby.       4.        Place a brightly colored sticker on the top of each container of milk pumped during the first 30 days.  This identifies the milk as special and having higher levels of nutrients and anti-infective properties that are so important for your baby.  Additional stickers can be obtained from the lactation consultants or your babys nurse.  5.       Do not touch the inside of the storage containers or lids.  6.      Pour the amount of expressed milk needed for 1 of your babys feedings into each   storage container. Use a new container(s) for each pumping.  Additional storage   containers can be obtained from your babys nurse.        7.       Tightly screw the lid onto the container and place immediately into the                                refrigerator fordaily transportation to the hospital.   Do not freeze your milk                     unless asked to do so by your babys nurse.  However, if you are not able to                            visit your baby each day, place the expressed breastmilk in the freezer.  8.       Expressed breastmilk should be refrigerated or frozen within 1 hour of                           pumping.  9.      Do not store expressed breastmilk on the door of your refrigerator or freezer where the temperature is warmer.            Transportation of Expressed Breastmilk:       1. Refrigerated breastmilk or frozen milk should be packed tightly together in a cooler with frozen, blue gel-packs to keep the milk frozen.  DO NOT USE ICE CUBES (WET ICE) TO TRANSPORT FROZEN MILK.   A clean towel can be used to fill any extra space between containers of frozen milk.  2.         Bring your expressed milk from home each time you visit the baby.

## 2017-02-24 NOTE — ANESTHESIA POSTPROCEDURE EVALUATION
"Anesthesia Post Evaluation    Patient: Dao Holt    Procedure(s) Performed: Procedure(s) (LRB):  DELIVERY- SECTION (N/A)    Final Anesthesia Type: epidural  Patient location during evaluation: labor & delivery  Patient participation: Yes- Able to Participate  Level of consciousness: awake and alert  Post-procedure vital signs: reviewed and stable  Pain management: adequate  Airway patency: patent  PONV status at discharge: No PONV  Anesthetic complications: no      Cardiovascular status: hemodynamically stable  Respiratory status: unassisted, spontaneous ventilation and room air  Hydration status: euvolemic  Follow-up not needed.        Visit Vitals    /78 (Patient Position: Sitting, BP Method: Automatic)    Pulse (!) 111    Temp 36.9 °C (98.4 °F) (Oral)    Resp 18    Ht 5' 6" (1.676 m)    Wt 98.9 kg (218 lb)    LMP 2016    SpO2 96%    Breastfeeding No    BMI 35.19 kg/m2       Pain/Christina Score: Pain Rating Prior to Med Admin: 7 (2017  5:09 AM)      "

## 2017-02-24 NOTE — LACTATION NOTE
02/24/17 1630   Maternal Infant Assessment   Breast Shape round;Bilateral:   Breast Density soft;Bilateral:   Areola elastic;Bilateral:   Nipple(s) everted;Bilateral:   Pain/Comfort Assessments   Pain Assessment Performed Yes       Number Scale   Presence of Pain denies   Location - Side Bilateral   Location nipple(s)   Pain Rating: Activity 0   Maternal Infant Feeding   Time Spent (min) 15-30 min   Breastfeeding Education adequate milk volume;importance of skin-to-skin contact;label/storage of breast milk;milk expression, electric pump;milk expression, hand   Equipment Type/Education   Pump Type Symphony   Breast Pump Type double electric, hospital grade   Breast Pump Flange Type hard   Breast Pump Flange Size 24 mm   Lactation Referrals   Lactation Consult Initial assessment;Knowledge deficit;Pump teaching   Lactation Interventions   Attachment Promotion counseling provided;family involvement promoted;infant-mother separation minimized;privacy provided;role responsibility promoted;rooming-in promoted;skin-to-skin contact encouraged   Breastfeeding Assistance electric breast pump used;milk expression/pumping;support offered   Maternal Breastfeeding Support diary/feeding log utilized;encouragement offered;infant-mother separation minimized;lactation counseling provided;maternal hydration promoted;maternal nutrition promoted;maternal rest encouraged   With patient's permission initiated use of breastpump; advised on imagery, relaxation and breastpumping techniques to facilitate milk transfer; encouraged hand expression p pumping;  provided NICU Lactation education; reviewed NICU lactation folder; patient's partner at her bedside offering suport and encouragement;

## 2017-02-24 NOTE — TRANSFER OF CARE
"Anesthesia Transfer of Care Note    Patient: Dao Holt    Procedure(s) Performed: Procedure(s) (LRB):  DELIVERY- SECTION (N/A)    Patient location: PACU    Anesthesia Type: CSE    Transport from OR: Transported from OR on room air with adequate spontaneous ventilation    Post pain: adequate analgesia    Post assessment: no apparent anesthetic complications    Post vital signs: stable    Level of consciousness: awake, alert and oriented    Nausea/Vomiting: no nausea/vomiting    Complications: none          Last vitals:   Visit Vitals    /63    Pulse 93    Temp 36.6 °C (97.9 °F) (Axillary)    Resp 17    Ht 5' 6" (1.676 m)    Wt 98.9 kg (218 lb)    LMP 2016    SpO2 100%    Breastfeeding No    BMI 35.19 kg/m2     "

## 2017-02-25 LAB
BASOPHILS # BLD AUTO: ABNORMAL K/UL
BASOPHILS NFR BLD: 0 %
BILIRUB UR QL STRIP: NEGATIVE
CLARITY UR: CLEAR
COLOR UR: YELLOW
DIFFERENTIAL METHOD: ABNORMAL
EOSINOPHIL # BLD AUTO: ABNORMAL K/UL
EOSINOPHIL NFR BLD: 0 %
ERYTHROCYTE [DISTWIDTH] IN BLOOD BY AUTOMATED COUNT: 12.8 %
GENTAMICIN SERPL-MCNC: 9.4 UG/ML
GLUCOSE UR QL STRIP: NEGATIVE
HCT VFR BLD AUTO: 29.1 %
HGB BLD-MCNC: 9.9 G/DL
HGB UR QL STRIP: ABNORMAL
KETONES UR QL STRIP: NEGATIVE
LEUKOCYTE ESTERASE UR QL STRIP: ABNORMAL
LYMPHOCYTES # BLD AUTO: ABNORMAL K/UL
LYMPHOCYTES NFR BLD: 7 %
MCH RBC QN AUTO: 30.6 PG
MCHC RBC AUTO-ENTMCNC: 34 %
MCV RBC AUTO: 90 FL
MICROSCOPIC COMMENT: NORMAL
MONOCYTES # BLD AUTO: ABNORMAL K/UL
MONOCYTES NFR BLD: 6 %
NEUTROPHILS NFR BLD: 83 %
NEUTS BAND NFR BLD MANUAL: 4 %
NITRITE UR QL STRIP: NEGATIVE
PH UR STRIP: 5 [PH] (ref 5–8)
PLATELET # BLD AUTO: 268 K/UL
PLATELET BLD QL SMEAR: ABNORMAL
PMV BLD AUTO: 8.6 FL
PROT UR QL STRIP: NEGATIVE
RBC # BLD AUTO: 3.24 M/UL
RBC #/AREA URNS HPF: 1 /HPF (ref 0–4)
SP GR UR STRIP: <=1.005 (ref 1–1.03)
SQUAMOUS #/AREA URNS HPF: 15 /HPF
URN SPEC COLLECT METH UR: ABNORMAL
UROBILINOGEN UR STRIP-ACNC: NEGATIVE EU/DL
WBC # BLD AUTO: 18.02 K/UL
WBC #/AREA URNS HPF: 1 /HPF (ref 0–5)

## 2017-02-25 PROCEDURE — 36415 COLL VENOUS BLD VENIPUNCTURE: CPT

## 2017-02-25 PROCEDURE — 81000 URINALYSIS NONAUTO W/SCOPE: CPT

## 2017-02-25 PROCEDURE — 85007 BL SMEAR W/DIFF WBC COUNT: CPT

## 2017-02-25 PROCEDURE — 11000001 HC ACUTE MED/SURG PRIVATE ROOM

## 2017-02-25 PROCEDURE — 85027 COMPLETE CBC AUTOMATED: CPT

## 2017-02-25 PROCEDURE — 80170 ASSAY OF GENTAMICIN: CPT

## 2017-02-25 PROCEDURE — 87086 URINE CULTURE/COLONY COUNT: CPT

## 2017-02-25 PROCEDURE — 25000003 PHARM REV CODE 250: Performed by: STUDENT IN AN ORGANIZED HEALTH CARE EDUCATION/TRAINING PROGRAM

## 2017-02-25 PROCEDURE — 63600175 PHARM REV CODE 636 W HCPCS: Performed by: STUDENT IN AN ORGANIZED HEALTH CARE EDUCATION/TRAINING PROGRAM

## 2017-02-25 RX ORDER — CLINDAMYCIN PHOSPHATE 900 MG/50ML
900 INJECTION, SOLUTION INTRAVENOUS
Status: DISCONTINUED | OUTPATIENT
Start: 2017-02-25 | End: 2017-02-27

## 2017-02-25 RX ORDER — ACETAMINOPHEN 325 MG/1
650 TABLET ORAL ONCE
Status: COMPLETED | OUTPATIENT
Start: 2017-02-25 | End: 2017-02-25

## 2017-02-25 RX ORDER — IRON POLYSACCHARIDE COMPLEX 150 MG
150 CAPSULE ORAL DAILY
Status: DISCONTINUED | OUTPATIENT
Start: 2017-02-25 | End: 2017-02-27 | Stop reason: HOSPADM

## 2017-02-25 RX ADMIN — OXYCODONE HYDROCHLORIDE AND ACETAMINOPHEN 1 TABLET: 10; 325 TABLET ORAL at 08:02

## 2017-02-25 RX ADMIN — CLINDAMYCIN IN 5 PERCENT DEXTROSE 900 MG: 18 INJECTION, SOLUTION INTRAVENOUS at 04:02

## 2017-02-25 RX ADMIN — CLINDAMYCIN IN 5 PERCENT DEXTROSE 900 MG: 18 INJECTION, SOLUTION INTRAVENOUS at 09:02

## 2017-02-25 RX ADMIN — SIMETHICONE CHEW TAB 80 MG 80 MG: 80 TABLET ORAL at 08:02

## 2017-02-25 RX ADMIN — OXYCODONE HYDROCHLORIDE AND ACETAMINOPHEN 1 TABLET: 10; 325 TABLET ORAL at 12:02

## 2017-02-25 RX ADMIN — SIMETHICONE CHEW TAB 80 MG 80 MG: 80 TABLET ORAL at 01:02

## 2017-02-25 RX ADMIN — NAPROXEN 500 MG: 500 TABLET ORAL at 06:02

## 2017-02-25 RX ADMIN — DOCUSATE SODIUM 200 MG: 100 CAPSULE, LIQUID FILLED ORAL at 08:02

## 2017-02-25 RX ADMIN — OXYCODONE HYDROCHLORIDE AND ACETAMINOPHEN 1 TABLET: 5; 325 TABLET ORAL at 01:02

## 2017-02-25 RX ADMIN — CLINDAMYCIN IN 5 PERCENT DEXTROSE 900 MG: 18 INJECTION, SOLUTION INTRAVENOUS at 01:02

## 2017-02-25 RX ADMIN — GENTAMICIN SULFATE 376 MG: 40 INJECTION, SOLUTION INTRAMUSCULAR; INTRAVENOUS at 03:02

## 2017-02-25 RX ADMIN — OXYCODONE HYDROCHLORIDE AND ACETAMINOPHEN 1 TABLET: 5; 325 TABLET ORAL at 06:02

## 2017-02-25 RX ADMIN — Medication 150 MG: at 10:02

## 2017-02-25 RX ADMIN — NAPROXEN 500 MG: 500 TABLET ORAL at 08:02

## 2017-02-25 RX ADMIN — ACETAMINOPHEN 650 MG: 325 TABLET ORAL at 12:02

## 2017-02-25 NOTE — PROGRESS NOTES
MD notified of pt's temp 100.5 & 114 bpm. Orders given for one time dose of 650mg tylenol and will come to bedside to assess.    MD to bedside for assessment. Giving tylenol and percocet at this time per order for pain and fever. After exam, orders given for temperature q4, CBC, UA/UC, and to begin ABX (MD to place new orders).    Will continue to monitor and maintain pt safety.

## 2017-02-25 NOTE — PROGRESS NOTES
To bedside for temperature of 100.5F, tachycardia.  Patient reports SOB with ambulation, none at rest. Denies CP.  Patient denies dysuria/frequency/urgency.  Patient denies purulent vaginal discharge, purulent wound discharge.  Patient denies LE erythema, unilateral edema or pain with ambulation.    Temp:  [98.4 °F (36.9 °C)-99.6 °F (37.6 °C)] 98.6 °F (37 °C)  Pulse:  [104-123] 112  Resp:  [16-19] 16  SpO2:  [95 %-100 %] 98 %  BP: (116-134)/(71-79) 116/71    PE:   Lungs: unable to adequately assess, shallow breathing  CV: tachycardia  Abdomen: nontender except for fundal tenderness (RN says stable from yesterday), mildly distended, no rebound or guarding,wound c/d/i  Extremities: no erythema, 2+ pedal edema, 1+ edema to bilateral calves    - Tylenol 650 now  - Temperature [id number]  - CBC, UA/UCx  - Gent/Clinda    Heidy Arenas MD  PGY-2 OB/GYN  [phone]

## 2017-02-25 NOTE — MEDICAL/APP STUDENT
Delivery Discharge Summary  Obstetrics      Primary OB Clinician: Sekou Sims MD, Shayy Villatoro MD    Admission date: 2017  Discharge date: **    Disposition: To home, self care    Admit Dx:      Patient Active Problem List   Diagnosis    Elderly multigravida in second trimester    Encounter for supervision of normal first pregnancy in second trimester    Sickle cell trait     premature rupture of membranes (PPROM) delivered, current hospitalization    S/P  section     Discharge Dx:    Patient Active Problem List   Diagnosis    Elderly multigravida in second trimester    Encounter for supervision of normal first pregnancy in second trimester    Sickle cell trait     premature rupture of membranes (PPROM) delivered, current hospitalization    S/P  section       Procedure: , due to arrest of dilation at 6cm x 9.5hrs.    Hospital Course:  Dao Holt is a 36 y.o. now , POD #*** who was admitted on 2017 at 35w3d for LOF. This IUP was complicated by AMA, PPROM and sickle cell trait. On initial assessment, vital signs were stable and physical exam was normal. Infant was in vertex presentation. Patient was subsequently admitted to labor and delivery unit with signed consents. Pain was managed with epidural anesthesia. Labor course was managed with pitocin for augmentation. Patient delivered a single viable  male. Please see delivery note for further details. Pt was in stable condition post delivery and was transferred to the Mother-Baby Unit. Patient developed endometritis POD#2 and it was resolved with Gentamycin and Clindamycin. Otherwise, her postpartum course was uncomplicated. On discharge day, patient's pain is controlled with oral pain medications. Pt is tolerating ambulation without SOB or CP, and PO diet without N/V. Reports lochia is ***mild. Denies any HA, vision changes, F/C, LE swelling. Denies any breast  pain/soreness.  Pt in stable condition and ready for discharge. She has been instructed to continue pain medications as needed and to follow up in the OB clinic in 6 weeks with her obstetrics provider.    Pertinent studies:  Postpartum CBC  Lab Results   Component Value Date    WBC 18.02 (H) 2017    HGB 9.9 (L) 2017    HCT 29.1 (L) 2017    MCV 90 2017     2017       Tubal Ligation: n/a  Feeding Method: breast  Rh Immune Globulin Given(A POS): N/A  Rubella Vaccine Given: N/A  Tdap Vaccine Given: 17    Delivery:    Episiotomy:     Lacerations:     Repair suture:     Repair # of packets:     Blood loss (ml): 0     Birth information:  YOB: 2017   Time of birth: 4:48 PM   Sex: male   Delivery type: , Low Transverse   Gestational Age: 35w4d    Delivery Clinician:      Other providers:       Additional  information:  Forceps:    Vacuum:    Breech:    Observed anomalies      Living?:           APGARS  One minute Five minutes Ten minutes   Skin color:         Heart rate:         Grimace:         Muscle tone:         Breathing:         Totals: 6  7  9      Placenta: Delivered:       appearance      Patient Instructions:   Current Discharge Medication List      START taking these medications    Details   ibuprofen (ADVIL,MOTRIN) 600 MG tablet Take 1 tablet (600 mg total) by mouth 3 (three) times daily.  Qty: 30 tablet, Refills: 2    Associated Diagnoses:  premature rupture of membranes (PPROM) delivered, current hospitalization;  delivery delivered; S/P  section      oxycodone-acetaminophen (PERCOCET) 5-325 mg per tablet Take 1 tablet by mouth every 4 (four) hours as needed.  Qty: 20 tablet, Refills: 0    Associated Diagnoses:  premature rupture of membranes (PPROM) delivered, current hospitalization;  delivery delivered; S/P  section         CONTINUE these medications which have NOT CHANGED    Details   prenatal  #108-iron,carbonyl-FA 30-1 mg Tab Take 1 tablet by mouth once daily.  Qty: 30 tablet, Refills: 6             No discharge procedures on file.

## 2017-02-25 NOTE — PLAN OF CARE
Problem: Patient Care Overview  Goal: Plan of Care Review  Outcome: Ongoing (interventions implemented as appropriate)  Lactation note:  LC called to room to review and assist with pumping for her NICU baby. Gave mother NICU Blue folder for lactation. Showed mother how to work pump, how to keep track of pumpings, how to label nicu breastmilk, how to clean pump parts and bring milk to NICU even if it is only a drop of milk. NICU uses mother's milk for mouth care so even small amounts are ok to bring to NICU. Mother aware to pump 8 or more times a day. Showed mother how to use Symphony pump on premie setting. Showed mom how to hand express and able to express a few ml's. Call RN with any further questions.Mother may want to start thinking about what she will pump with when she gets home. May need to call insurance for pump options.

## 2017-02-25 NOTE — DISCHARGE INSTRUCTIONS
Preparation and Hygiene:    1. Shower daily.  2. Wear a clean bra each day and wash daily in warm soapy water.  3. Change wet or moist breast pads frequently.  Moist pads can promote growth of germs.  4. Actively wash your hands, paying close attention to the area around and under your fingernails, thoroughly with soap and water for 15 seconds before pumping or handling your milk.  Re-wash your hands if you touch anything (scratching your nose, answering the phone, etc) while pumping or handling your milk.   5. Before pumping your breasts, assemble the pump collection kit and have ready the sterile container and labels.  Place these items on a clean surface next to the breastpump.  6. Each time after you have finished pumping, take apart all of the parts of the breastpump collection kit and place them in a separate cleaning container (do not place them in the sink).  Be sure to remove the yellow valve from the breastshield and separate the white membrane from the yellow valve.  Rinse all of these parts with cool water.  Then use a new sponge and/or bottle brush and dishwashing detergent to clean the parts.  Rinse off the soapy water with cool water and air dry on a clean towel covered with a clean cloth.  All parts may also be washed after each use in the top rack of a .  7. Once each day, sterilize all of the parts of the breastpump collection kit.  This can be done by boiling the kit parts for 10 minutes or by using a Quick Clean Micro-Steam Bag made by [employer]  8. If condensation appears in the tubing, continue to run the pump with the tubing attached for 1-2 minutes or until the tubing is dry.   9. Notify your babys nurse or doctor if you become ill or need to take any medication, even over-the-counter medicines.        Collection and Storage of Expressed Breastmilk:         1. Pump your breasts at least 8-10 times every 24 hours.  Double pump (both breasts at  the same time) for at least 15-20  minutes using the most suction that is comfortable.    2. Write the date and time of pumping and the name of any medications you are takingon the babys pre-printed hospital identification label.   3. Place your babys pre-printed hospital identification label on each container of breastmilk.  Additional pre-printed labels can be obtained from your babys nurse.  If your expressed breastmilk is not correctly labeled, the nurse cannot feed the milk to the baby.       4. Place a brightly colored sticker on the top of each container of milk pumped during the first 30 days.  This identifies the milk as special and having higher levels of nutrients and anti-infective properties that are so important for your baby.  Additional stickers can be obtained from the lactation consultants or your babys nurse.  5.   Do not touch the inside of the storage containers or lids.  6.      Pour the amount of expressed milk needed for 1 of your babys feedings into each   storage container. Use a new container(s) for each pumping.  Additional storage   containers can be obtained from your babys nurse.        7.       Tightly screw the lid onto the container and place immediately into the       refrigerator fordaily transportation to the hospital.   Do not freeze your milk      unless asked to do so by your babys nurse.  However, if you are not able to      visit your baby each day, place the expressed breastmilk in the freezer.  8.   Expressed breastmilk should be refrigerated or frozen within 1 hour of      pumping.  9.      Do not store expressed breastmilk on the door of your refrigerator or freezer where the temperature is warmer.         Transportation of Expressed Breastmilk:    1. Refrigerated breastmilk or frozen milk should be packed tightly together in a cooler with frozen, blue gel-packs to keep the milk frozen.  DO NOT USE ICE CUBES (WET ICE) TO TRANSPORT FROZEN MILK.   A clean towel can be used to fill any extra space  between containers of frozen milk.  2.    Bring your expressed milk from home each time you visit the baby.        NICU lactation warmline:  [phone]    Lactation warmline:  [phone]

## 2017-02-25 NOTE — PROGRESS NOTES
POSTPARTUM PROGRESS NOTE     Dao Holt is a 36 y.o. female POD #2 status post Primary  section at 35w5d 2/2 PPROM, arrest of dilation in a pregnancy complicated by sickle cell trait. Patient is doing well this morning.   Overnight, she was noted to spike a fever to 100.5F at 0027. She reported mild SOB with ambulation on questioning (O2 sats %). She denies nausea, vomiting, fever or chills.  Patient reports moderate abdominal pain that is adequately relieved by oral pain medications. Lochia is mild and decreasing. Patient is urinating and ambulating without difficulty. She has passed flatus, and has not had BM.  Patient does plan to breast feed. Contraception to be discussed at 6 week postpartum visit. She desires circumcision - deferred 2/2 infant in NICU.    Objective:       Temp:  [98.4 °F (36.9 °C)-99.6 °F (37.6 °C)] 98.4 °F (36.9 °C)  Pulse:  [] 104  Resp:  [18-19] 18  SpO2:  [95 %-100 %] 100 %  BP: (118-134)/(67-79) 122/79    General:   alert, appears stated age and cooperative   Lungs:   clear to auscultation bilaterally   Heart:   regular rate and rhythm, S1, S2 normal, no murmur, click, rub or gallop   Abdomen:  mildly distended, + fundal tenderness, nontender elsewhere   Uterus:  firm located at the umblicus.        Incision: clean, dry and intact   Extremities: 2+ pedal edema, 1+ to bilateral calves     Lab Review  No results found for this or any previous visit (from the past 4 hour(s)).    I/O    Intake/Output Summary (Last 24 hours) at 17 1809  Last data filed at 17 1700   Gross per 24 hour   Intake                0 ml   Output             2060 ml   Net            -2060 ml        Assessment:     Patient Active Problem List   Diagnosis    Elderly multigravida in second trimester    Encounter for supervision of normal first pregnancy in second trimester    Sickle cell trait     premature rupture of membranes (PPROM) delivered, current hospitalization     S/P  section        Plan:   1. Postpartum care:  - Patient doing well. Continue routine management and advances.  - Continue PO pain meds. Pain well controlled.  - Heme: H/h 12/36 --> 9.9/29.1   - Fe/colace  - Encourage ambulation  - Circumcision: infant in NICU  - Contraception to be discussed PP  - Lactation consult PRN    2. Endometritis  - Tmax, last 100.5F @ 0027  - WBC 15.9-->21.8-->18.0  - Tylenol  - UA tr leuks, UCx p  - Gent/Clinda    Dispo: As patient meets milestones, will plan to discharge POD#3-4.    Heidy Arenas     I agree with the resident's evaluation and therapeutic plan

## 2017-02-26 LAB — BACTERIA UR CULT: NO GROWTH

## 2017-02-26 PROCEDURE — 11000001 HC ACUTE MED/SURG PRIVATE ROOM

## 2017-02-26 PROCEDURE — 63600175 PHARM REV CODE 636 W HCPCS: Performed by: STUDENT IN AN ORGANIZED HEALTH CARE EDUCATION/TRAINING PROGRAM

## 2017-02-26 PROCEDURE — 25000003 PHARM REV CODE 250: Performed by: STUDENT IN AN ORGANIZED HEALTH CARE EDUCATION/TRAINING PROGRAM

## 2017-02-26 RX ADMIN — OXYCODONE HYDROCHLORIDE AND ACETAMINOPHEN 1 TABLET: 10; 325 TABLET ORAL at 06:02

## 2017-02-26 RX ADMIN — NAPROXEN 500 MG: 500 TABLET ORAL at 06:02

## 2017-02-26 RX ADMIN — GENTAMICIN SULFATE 376 MG: 40 INJECTION, SOLUTION INTRAMUSCULAR; INTRAVENOUS at 02:02

## 2017-02-26 RX ADMIN — CLINDAMYCIN IN 5 PERCENT DEXTROSE 900 MG: 18 INJECTION, SOLUTION INTRAVENOUS at 02:02

## 2017-02-26 RX ADMIN — DOCUSATE SODIUM 200 MG: 100 CAPSULE, LIQUID FILLED ORAL at 10:02

## 2017-02-26 RX ADMIN — SIMETHICONE CHEW TAB 80 MG 80 MG: 80 TABLET ORAL at 01:02

## 2017-02-26 RX ADMIN — OXYCODONE HYDROCHLORIDE AND ACETAMINOPHEN 1 TABLET: 10; 325 TABLET ORAL at 02:02

## 2017-02-26 RX ADMIN — OXYCODONE HYDROCHLORIDE AND ACETAMINOPHEN 1 TABLET: 10; 325 TABLET ORAL at 01:02

## 2017-02-26 RX ADMIN — OXYCODONE HYDROCHLORIDE AND ACETAMINOPHEN 1 TABLET: 10; 325 TABLET ORAL at 10:02

## 2017-02-26 RX ADMIN — NAPROXEN 500 MG: 500 TABLET ORAL at 10:02

## 2017-02-26 RX ADMIN — CLINDAMYCIN IN 5 PERCENT DEXTROSE 900 MG: 18 INJECTION, SOLUTION INTRAVENOUS at 06:02

## 2017-02-26 RX ADMIN — SIMETHICONE CHEW TAB 80 MG 80 MG: 80 TABLET ORAL at 06:02

## 2017-02-26 RX ADMIN — Medication 150 MG: at 10:02

## 2017-02-26 RX ADMIN — CLINDAMYCIN IN 5 PERCENT DEXTROSE 900 MG: 18 INJECTION, SOLUTION INTRAVENOUS at 10:02

## 2017-02-26 RX ADMIN — NAPROXEN 500 MG: 500 TABLET ORAL at 01:02

## 2017-02-26 RX ADMIN — SIMETHICONE CHEW TAB 80 MG 80 MG: 80 TABLET ORAL at 10:02

## 2017-02-26 NOTE — LACTATION NOTE
02/26/17 0945   Maternal Infant Assessment   Breast Density Bilateral:;filling       Number Scale   Presence of Pain denies   Location nipple(s)   Pain Rating: Rest 0   Pain Rating: Activity 0   Maternal Infant Feeding   Maternal Emotional State relaxed   Time Spent (min) 0-15 min   Equipment Type/Education   Pump Type Symphony   Breast Pump Type double electric, hospital grade   Breast Pump Flange Type hard   Breast Pump Flange Size 27 mm   Pumping Frequency (times) (8-10 per  24)   Lactation Interventions   Maternal Breastfeeding Support diary/feeding log utilized;encouragement offered;infant-mother separation minimized;lactation counseling provided;maternal hydration promoted;maternal nutrition promoted;maternal rest encouraged   mother pumping for nicu infant. Continue to pump 8-10 per 24 hours. To call medicaid and WIC regarding breast pump.

## 2017-02-26 NOTE — PROGRESS NOTES
POSTPARTUM PROGRESS NOTE     Dao Holt is a 36 y.o. female POD #3 status post Primary  section at 35w5d 2/2 PPROM, arrest of dilation in a pregnancy complicated by sickle cell trait. Patient is doing well this morning.   Afebrile overnight.  She denies nausea, vomiting, fever or chills.  Patient reports mild to moderate abdominal pain that is adequately relieved by oral pain medications. Lochia is mild and decreasing. Patient is urinating and ambulating without difficulty. She has passed flatus, and has not had BM.  Patient does plan to breast feed. She desires circumcision - deferred 2/2 infant in NICU.    Objective:       Temp:  [97.4 °F (36.3 °C)-98.4 °F (36.9 °C)] 98.4 °F (36.9 °C)  Pulse:  [] 88  Resp:  [15-20] 16  SpO2:  [98 %-100 %] 98 %  BP: (112-127)/(57-84) 122/68    General:   alert, appears stated age and cooperative   Lungs:   clear to auscultation bilaterally   Heart:   regular rate and rhythm, S1, S2 normal, no murmur, click, rub or gallop   Abdomen:  mildly distended, + fundal tenderness, nontender elsewhere   Uterus:  firm located at the umblicus.        Incision: Incision intact without erythema, edema, or drainage   Extremities: Non-TTP     Lab Review  No results found for this or any previous visit (from the past 4 hour(s)).    I/O  No intake or output data in the 24 hours ending 17 0654     Assessment:     Patient Active Problem List   Diagnosis    Elderly multigravida in second trimester    Encounter for supervision of normal first pregnancy in second trimester    Sickle cell trait     premature rupture of membranes (PPROM) delivered, current hospitalization    S/P  section        Plan:   1. Postpartum care:  - Patient doing well. Continue routine management and advances.  - Continue PO pain meds. Pain well controlled.  - Heme: H/h  --> 9.9/29.1   - Fe/colace  - Encourage ambulation  - Circumcision: infant in NICU  - Contraception to be  discussed PP  - Lactation consult PRN  - Rh positive    2. Endometritis  - Tmax, last 100.5F 2/25 @ 0027  - WBC 15.9-->21.8-->18.0  - Tylenol prn  - UA tr leuks, UCx p  - Gent/Clinda, continue until 48h AF    Dispo: As patient meets milestones, will plan to discharge POD#4.    Tatiana Almeida

## 2017-02-26 NOTE — PLAN OF CARE
Problem: Patient Care Overview  Goal: Plan of Care Review  Outcome: Ongoing (interventions implemented as appropriate)  VSS. Afebrile on this shift. Fundus firm. Bleeding light. Incision WDL. Pumping for infant in the NICU. Ambulation encouraged. Passing flatus. Voiding without difficulty. Antibiotics administered. Pain adequately controlled with po pain medications. Pt visiting infant in NICU.  at bedside. Pt updated on plan of care. Verbalized understanding. Denies questions or concerns. Will continue to monitor.

## 2017-02-27 VITALS
HEART RATE: 80 BPM | RESPIRATION RATE: 18 BRPM | TEMPERATURE: 98 F | DIASTOLIC BLOOD PRESSURE: 67 MMHG | OXYGEN SATURATION: 100 % | WEIGHT: 218 LBS | BODY MASS INDEX: 35.03 KG/M2 | HEIGHT: 66 IN | SYSTOLIC BLOOD PRESSURE: 139 MMHG

## 2017-02-27 PROCEDURE — 25000003 PHARM REV CODE 250: Performed by: STUDENT IN AN ORGANIZED HEALTH CARE EDUCATION/TRAINING PROGRAM

## 2017-02-27 PROCEDURE — 36000685 HC CESAREAN SECTION LEVEL I

## 2017-02-27 RX ORDER — IBUPROFEN 600 MG/1
600 TABLET ORAL EVERY 6 HOURS
Status: DISCONTINUED | OUTPATIENT
Start: 2017-02-27 | End: 2017-02-27 | Stop reason: HOSPADM

## 2017-02-27 RX ADMIN — SIMETHICONE CHEW TAB 80 MG 80 MG: 80 TABLET ORAL at 12:02

## 2017-02-27 RX ADMIN — DOCUSATE SODIUM 200 MG: 100 CAPSULE, LIQUID FILLED ORAL at 08:02

## 2017-02-27 RX ADMIN — OXYCODONE HYDROCHLORIDE AND ACETAMINOPHEN 1 TABLET: 10; 325 TABLET ORAL at 04:02

## 2017-02-27 RX ADMIN — OXYCODONE HYDROCHLORIDE AND ACETAMINOPHEN 1 TABLET: 10; 325 TABLET ORAL at 12:02

## 2017-02-27 RX ADMIN — OXYCODONE HYDROCHLORIDE AND ACETAMINOPHEN 1 TABLET: 10; 325 TABLET ORAL at 08:02

## 2017-02-27 RX ADMIN — SIMETHICONE CHEW TAB 80 MG 80 MG: 80 TABLET ORAL at 06:02

## 2017-02-27 RX ADMIN — IBUPROFEN 600 MG: 600 TABLET, FILM COATED ORAL at 12:02

## 2017-02-27 RX ADMIN — NAPROXEN 500 MG: 500 TABLET ORAL at 04:02

## 2017-02-27 RX ADMIN — MAGNESIUM HYDROXIDE 2400 MG: 400 SUSPENSION ORAL at 05:02

## 2017-02-27 RX ADMIN — Medication 150 MG: at 08:02

## 2017-02-27 NOTE — DISCHARGE SUMMARY
Delivery Discharge Summary  Obstetrics      Primary OB Clinician: ONDINA Sims    Admission date: 2017  Discharge date: 2017    Disposition: To home, self care    Admit Dx:      Patient Active Problem List   Diagnosis    Elderly multigravida in second trimester    Encounter for supervision of normal first pregnancy in second trimester    Sickle cell trait     premature rupture of membranes (PPROM) delivered, current hospitalization    S/P  section     Discharge Dx:    Patient Active Problem List   Diagnosis    Elderly multigravida in second trimester    Encounter for supervision of normal first pregnancy in second trimester    Sickle cell trait     premature rupture of membranes (PPROM) delivered, current hospitalization    S/P  section     Procedure: , due to arrest of dilation at 6cm     Hospital Course:  Dao Holt is a 36 y.o. now , POD #4 who was admitted on 2017 at 35w3d for PPROM. On initial assessment, vital signs were stable and physical exam was normal. Infant was in cephalic presentation. Patient was subsequently admitted to labor and delivery unit with signed consents. Labor course was managed with pitocin, betamethasone, and IUPC. Despite induction efforts patient arrested at 6cm dilation with no change in about 9 hours, and the decision was made to proceed to the OR for 1LTCS. Patient delivered a single viable  male. Please see delivery note for further details. Pt was in stable condition post delivery and was transferred to the Mother-Baby Unit. Her postpartum course was complicated by endometritis for which she was treated with gentamycin and clindamycin, she remainedd afebrile for 48 hours . On discharge day, patient's pain is controlled with oral pain medications. Pt is tolerating ambulation without SOB or CP, and PO diet without N/V. Reports lochia is mild. Denies any HA, vision changes, F/C, LE swelling. Denies any  breast pain/soreness.  Pt in stable condition and ready for discharge. She has been instructed to continue pain medications as needed and to follow up in the OB clinic in 2, and 6 weeks with her obstetrics provider.    Pertinent studies:  Postpartum CBC  Lab Results   Component Value Date    WBC 18.02 (H) 2017    HGB 9.9 (L) 2017    HCT 29.1 (L) 2017    MCV 90 2017     2017     Tubal Ligation: n/a  Feeding Method: breast  Rh Immune Globulin Given(A POS): N/A  Rubella Vaccine Given: N/A - immune  Tdap Vaccine Given: utd 2017    Delivery:    Episiotomy:     Lacerations:     Repair suture:     Repair # of packets:     Blood loss (ml): 0     Birth information:  YOB: 2017   Time of birth: 4:48 PM   Sex: male   Delivery type: , Low Transverse   Gestational Age: 35w4d    Delivery Clinician:      Other providers:       Additional  information:  Forceps:    Vacuum:    Breech:    Observed anomalies      Living?:           APGARS  One minute Five minutes Ten minutes   Skin color:         Heart rate:         Grimace:         Muscle tone:         Breathing:         Totals: 6  7  9      Placenta: Delivered:       appearance      Patient Instructions:   Current Discharge Medication List      START taking these medications    Details   ibuprofen (ADVIL,MOTRIN) 600 MG tablet Take 1 tablet (600 mg total) by mouth 3 (three) times daily.  Qty: 30 tablet, Refills: 2    Associated Diagnoses:  premature rupture of membranes (PPROM) delivered, current hospitalization;  delivery delivered; S/P  section      oxycodone-acetaminophen (PERCOCET) 5-325 mg per tablet Take 1 tablet by mouth every 4 (four) hours as needed.  Qty: 20 tablet, Refills: 0    Associated Diagnoses:  premature rupture of membranes (PPROM) delivered, current hospitalization;  delivery delivered; S/P  section         CONTINUE these medications which have NOT  CHANGED    Details   prenatal #108-iron,carbonyl-FA 30-1 mg Tab Take 1 tablet by mouth once daily.  Qty: 30 tablet, Refills: 6               Discharge Procedure Orders  Diet general     Activity as tolerated     Call MD for:  increased confusion or weakness     Call MD for:  persistent dizziness, light-headedness, or visual disturbances     Call MD for:  worsening rash     Call MD for:  severe persistent headache     Call MD for:  difficulty breathing or increased cough     Call MD for:  redness, tenderness, or signs of infection (pain, swelling, redness, odor or green/yellow discharge around incision site)     Call MD for:  severe uncontrolled pain     Call MD for:  persistent nausea and vomiting or diarrhea     Call MD for:  temperature >100.4         Follow-up Information     Follow up with Sekou Sims MD In 2 weeks.    Specialties:  Obstetrics, Obstetrics and Gynecology    Why:  Post-op checkup    Contact information:    [address] [place], [place] [id number] [phone]          Follow up with Sekou Sims MD In 6 weeks.    Specialties:  Obstetrics, Obstetrics and Gynecology    Why:  Post-partum check-up    Contact information:    [address] [id number] [phone]          Cosme Naranjo MD   PGY-2 Ob-gyn    Doing well, no complaints, ready for D/C.

## 2017-02-27 NOTE — PLAN OF CARE
COPIED FROM INFANT'S CHART    SOCIAL WORK DISCHARGE PLANNING ASSESSMENT     Sw completed discharge planning assessment with pt's parents in mother's room 631. Pt's parents were easily engaged. Education on the role of  was provided. Emotional support provided throughout assessment.        Legal Name: Andrez Holt : 2017         Patient Active Problem List   Diagnosis    Prolonged rupture of membranes      infant of 35 completed weeks of gestation    GBS (group B streptococcus) infection    Jaundice of             Birth Hospital:[facility]  CHANTALE: [birth year]     Birth Weight: 2.608 kg (5 lb 12 oz)  Birth Length: 48.3cm  Gestational Age: 35w4d      Metz Assessment    Living status: Yes   Apgars     1 Minute:  5 Minute:  10 Minute 15 Minute 20 Minute   Skin Color: 0  1  1        Heart Rate: 2  2  2        Reflex Irritability: 1  1  2        Muscle Tone: 2  1  2        Respiratory Effort: 1  2  2        Total: 6  7  9                         Apgars Assigned By: NICU             Mother: Dao Holt, age 36,  1980  Address: [address] [place] [address]  Phone: [phone]  Employer: none    Job Title: n/a  Education: some college         Father: Geovanny Holt, age 31,  1985  Address: [address] [place] [address]  Phone: [phone]  Employer: [employer]   Job Title:    Education: GED  Signed Birth Certificate: Yes; parents are .     Support person(s): Graham Olivia ([facility] [place] [phone]     Sibling(s): none     Spiritual Affiliation: None     Commercial Insurance Coverage: No      [facility] Health Plan (formerly [place] Medicaid): Primary: Yes Secondary: No   [facility]      Pediatrician: Instructed to decide soon and inform RN      Nutrition: Expressed Breast Milk   Breast Pump:  No   Plans to obtain from WIC   WIC:  Mom already certified; will also apply for          Essential Items: (includes car  seat, crib/bassinet/pack-n-play, clothing, bottles, diapers, etc.)  Plans to acquire by discharge      Transportation: Personal vehicle      Education: Information given on CPR classes and Physician/NNP daily rounds.      Resources Given: [facility], [facility], Medicaid transportation, Immunizations, Glossary of Commonly Used Terms, SSI Benefits, Preparing for Your Baby's Discharge Home, Support Resources for NICU Families, Insurance Coverage of Breast Pumps and Supplies, Breast Pumps through [facility], [facility], [employer], and [facility].       Potential Eligibility for SSI Benefits: No     Potential Discharge Needs:  None           02/27/17 0958   Discharge Assessment   Assessment Type Discharge Planning Assessment   Confirmed/corrected address and phone number on facesheet? Yes   Assessment information obtained from? Caregiver   Lives With parent(s)   Is patient able to care for self after discharge? Patient is of pediatric age   How many people do you have in your home that can help with your care after discharge? 2   Discharge Plan A Home with family         [facility]  NICU   Phone [phone] Ext. [phone]  [email or web address]

## 2017-02-27 NOTE — PLAN OF CARE
Problem: Patient Care Overview  Goal: Plan of Care Review  Outcome: Ongoing (interventions implemented as appropriate)  Lactation Note: LC did DC teaching for NICU mother pumping for her baby. Mother has NICU Blue folder for lactation. Reviewed how to work pump, how to keep track of pumpings, how to label nicu breastmilk, how to clean pump parts and bring milk to NICU even if it is only a drop of milk. NICU uses mother's milk for mouth care so even small amounts are ok to bring to NICU. Mother aware to pump 8 or more times a day for 15-20 minutes. Mother is aware of proper techniques for transporting her breastmilk and is aware of the written instructions in her blue folder.  Mother has the MBU phone number and the NICU  phone number to call for further questions. Mom will call [facility] for a hospital grade pump. Mom will let  know if she can't get pump form [facility] and decides to rent pump.

## 2017-02-27 NOTE — PROGRESS NOTES
POSTPARTUM PROGRESS NOTE     Dao Holt is a 36 y.o. female POD #4 status post Primary  section at 35w5d 2/2 PPROM, arrest of dilation in a pregnancy complicated by sickle cell trait. Patient is doing well this morning.   Afebrile overnight.  She denies nausea, vomiting, fever or chills.  Patient reports mild to moderate abdominal pain that is adequately relieved by oral pain medications. Lochia is mild and decreasing. Patient is urinating and ambulating without difficulty. She has passed flatus, and has not had BM.  Patient does plan to breast feed. She desires circumcision - deferred 2/2 infant in NICU.    Objective:       Temp:  [97.6 °F (36.4 °C)-98.2 °F (36.8 °C)] 97.9 °F (36.6 °C)  Pulse:  [82-90] 88  Resp:  [16-20] 18  SpO2:  [96 %-100 %] 100 %  BP: (125-140)/(77-81) 140/78    General:   alert, appears stated age and cooperative   Lungs:   clear to auscultation bilaterally   Heart:   regular rate and rhythm, S1, S2 normal, no murmur, click, rub or gallop   Abdomen:  mildly distended, + fundal tenderness, nontender elsewhere   Uterus:  firm located below umbilicus, no fundal tenderness       Incision: Incision intact without erythema, edema, or drainage   Extremities: Non-TTP     Lab Review  No results found for this or any previous visit (from the past 4 hour(s)).    I/O  No intake or output data in the 24 hours ending 17 0655     Assessment:     Patient Active Problem List   Diagnosis    Elderly multigravida in second trimester    Encounter for supervision of normal first pregnancy in second trimester    Sickle cell trait     premature rupture of membranes (PPROM) delivered, current hospitalization    S/P  section        Plan:   1. Postpartum care:  - Patient doing well. Continue routine management and advances.  - Continue PO pain meds. Pain well controlled.  - Heme: H/h 12/ --> 9.9/29.1   - Fe/colace  - Encourage ambulation  - Circumcision: infant in NICU  -  Contraception to be discussed PP  - Lactation consult PRN  - Rh positive    2. Endometritis  - Tmax, last 100.5F 2/25 @ 0027 (>48h AF)  - WBC 15.9-->21.8-->18.0  - Tylenol prn  - UA tr leuks, UCx no growth  - s/p Gent/Clinda    Dispo: As patient meets milestones, will plan to discharge POD#4. Patient and FOB report concern as FOB cannot take time off of work and there is no one else to help patient care for herself or her infant, who is currently in the NICU. Will follow-up with charge and consult SW if needed.    Tatiana Almeida     I agree with the above resident's evaluation and therapeutic plan

## 2017-03-01 ENCOUNTER — TELEPHONE (OUTPATIENT)
Dept: OBSTETRICS AND GYNECOLOGY | Facility: CLINIC | Age: 37
End: 2017-03-01

## 2017-03-01 NOTE — TELEPHONE ENCOUNTER
----- Message from Marissa Cowan sent at 3/1/2017  3:55 PM CST -----  Contact: pt  x_  1st Request  _  2nd Request  _  3rd Request      Who:pt    Why: pt calling in regards to her Routine Appointment     What Number to Call Back: [phone]    When to Expect a call back: (Before the end of the day)   -- if call after 3:00 call back will be tomorrow.

## 2017-03-01 NOTE — TELEPHONE ENCOUNTER
Patient was scheduled for her PP visit after declining the first appointment offered to her because her  will be working.

## 2017-03-02 ENCOUNTER — HOSPITAL ENCOUNTER (INPATIENT)
Facility: OTHER | Age: 37
LOS: 1 days | Discharge: HOME | DRG: 776 | End: 2017-03-03
Attending: OBSTETRICS & GYNECOLOGY | Admitting: OBSTETRICS & GYNECOLOGY
Payer: MEDICAID

## 2017-03-02 DIAGNOSIS — O14.10 PREECLAMPSIA, SEVERE, UNSPECIFIED TRIMESTER: ICD-10-CM

## 2017-03-02 DIAGNOSIS — O14.10 PREECLAMPSIA, SEVERE: Primary | ICD-10-CM

## 2017-03-02 DIAGNOSIS — Z98.891 S/P CESAREAN SECTION: ICD-10-CM

## 2017-03-02 DIAGNOSIS — L03.311 CELLULITIS OF ABDOMINAL WALL: ICD-10-CM

## 2017-03-02 LAB
ALBUMIN SERPL BCP-MCNC: 2.5 G/DL
ALP SERPL-CCNC: 91 U/L
ALT SERPL W/O P-5'-P-CCNC: 23 U/L
ANION GAP SERPL CALC-SCNC: 10 MMOL/L
AST SERPL-CCNC: 30 U/L
BACTERIA #/AREA URNS HPF: NORMAL /HPF
BASOPHILS # BLD AUTO: 0.01 K/UL
BASOPHILS NFR BLD: 0.1 %
BILIRUB SERPL-MCNC: 0.4 MG/DL
BILIRUB SERPL-MCNC: NORMAL MG/DL
BILIRUB UR QL STRIP: NEGATIVE
BLOOD URINE, POC: NORMAL
BUN SERPL-MCNC: 9 MG/DL
CALCIUM SERPL-MCNC: 9.1 MG/DL
CHLORIDE SERPL-SCNC: 107 MMOL/L
CLARITY UR: CLEAR
CO2 SERPL-SCNC: 23 MMOL/L
COLOR UR: YELLOW
COLOR, POC UA: YELLOW
CREAT SERPL-MCNC: 0.8 MG/DL
CREAT UR-MCNC: 19.6 MG/DL
DIFFERENTIAL METHOD: ABNORMAL
EOSINOPHIL # BLD AUTO: 0.1 K/UL
EOSINOPHIL NFR BLD: 0.5 %
ERYTHROCYTE [DISTWIDTH] IN BLOOD BY AUTOMATED COUNT: 12.6 %
EST. GFR  (AFRICAN AMERICAN): >60 ML/MIN/1.73 M^2
EST. GFR  (NON AFRICAN AMERICAN): >60 ML/MIN/1.73 M^2
FLUAV AG SPEC QL IA: NEGATIVE
FLUBV AG SPEC QL IA: NEGATIVE
GLUCOSE SERPL-MCNC: 80 MG/DL
GLUCOSE UR QL STRIP: NEGATIVE
GLUCOSE UR QL STRIP: NORMAL
HCT VFR BLD AUTO: 30 %
HGB BLD-MCNC: 10 G/DL
HGB UR QL STRIP: ABNORMAL
KETONES UR QL STRIP: NEGATIVE
KETONES UR QL STRIP: NORMAL
LDH SERPL L TO P-CCNC: 304 U/L
LEUKOCYTE ESTERASE UR QL STRIP: ABNORMAL
LEUKOCYTE ESTERASE URINE, POC: NORMAL
LYMPHOCYTES # BLD AUTO: 0.9 K/UL
LYMPHOCYTES NFR BLD: 7.9 %
MCH RBC QN AUTO: 29.9 PG
MCHC RBC AUTO-ENTMCNC: 33.3 %
MCV RBC AUTO: 90 FL
MICROSCOPIC COMMENT: NORMAL
MONOCYTES # BLD AUTO: 1.2 K/UL
MONOCYTES NFR BLD: 11.2 %
NEUTROPHILS # BLD AUTO: 8.7 K/UL
NEUTROPHILS NFR BLD: 79.4 %
NITRITE UR QL STRIP: NEGATIVE
NITRITE, POC UA: NORMAL
PH UR STRIP: 6 [PH] (ref 5–8)
PH, POC UA: 6
PLATELET # BLD AUTO: 434 K/UL
PMV BLD AUTO: 8.4 FL
POTASSIUM SERPL-SCNC: 4.4 MMOL/L
PROT SERPL-MCNC: 7.3 G/DL
PROT UR QL STRIP: NEGATIVE
PROT UR-MCNC: <7 MG/DL
PROT/CREAT RATIO, UR: NORMAL
PROTEIN, POC: NORMAL
RBC # BLD AUTO: 3.35 M/UL
RBC #/AREA URNS HPF: 3 /HPF (ref 0–4)
SODIUM SERPL-SCNC: 140 MMOL/L
SP GR UR STRIP: <=1.005 (ref 1–1.03)
SPECIFIC GRAVITY, POC UA: 1
SPECIMEN SOURCE: NORMAL
SQUAMOUS #/AREA URNS HPF: 4 /HPF
URATE SERPL-MCNC: 5.6 MG/DL
URN SPEC COLLECT METH UR: ABNORMAL
UROBILINOGEN UR STRIP-ACNC: NEGATIVE EU/DL
UROBILINOGEN, POC UA: NORMAL
WBC # BLD AUTO: 10.91 K/UL
WBC #/AREA URNS HPF: 1 /HPF (ref 0–5)

## 2017-03-02 PROCEDURE — 87400 INFLUENZA A/B EACH AG IA: CPT | Mod: 59

## 2017-03-02 PROCEDURE — 63600175 PHARM REV CODE 636 W HCPCS: Performed by: STUDENT IN AN ORGANIZED HEALTH CARE EDUCATION/TRAINING PROGRAM

## 2017-03-02 PROCEDURE — 96376 TX/PRO/DX INJ SAME DRUG ADON: CPT

## 2017-03-02 PROCEDURE — 85025 COMPLETE CBC W/AUTO DIFF WBC: CPT

## 2017-03-02 PROCEDURE — 99284 EMERGENCY DEPT VISIT MOD MDM: CPT | Mod: 25

## 2017-03-02 PROCEDURE — 11000001 HC ACUTE MED/SURG PRIVATE ROOM

## 2017-03-02 PROCEDURE — 25000003 PHARM REV CODE 250: Performed by: STUDENT IN AN ORGANIZED HEALTH CARE EDUCATION/TRAINING PROGRAM

## 2017-03-02 PROCEDURE — 81002 URINALYSIS NONAUTO W/O SCOPE: CPT

## 2017-03-02 PROCEDURE — 25000003 PHARM REV CODE 250

## 2017-03-02 PROCEDURE — 25000003 PHARM REV CODE 250: Performed by: OBSTETRICS & GYNECOLOGY

## 2017-03-02 PROCEDURE — 81000 URINALYSIS NONAUTO W/SCOPE: CPT

## 2017-03-02 PROCEDURE — 83615 LACTATE (LD) (LDH) ENZYME: CPT

## 2017-03-02 PROCEDURE — 80053 COMPREHEN METABOLIC PANEL: CPT

## 2017-03-02 PROCEDURE — 96374 THER/PROPH/DIAG INJ IV PUSH: CPT

## 2017-03-02 PROCEDURE — 99282 EMERGENCY DEPT VISIT SF MDM: CPT | Mod: ,,, | Performed by: OBSTETRICS & GYNECOLOGY

## 2017-03-02 PROCEDURE — 84156 ASSAY OF PROTEIN URINE: CPT

## 2017-03-02 PROCEDURE — 84550 ASSAY OF BLOOD/URIC ACID: CPT

## 2017-03-02 RX ORDER — NIFEDIPINE 30 MG/1
30 TABLET, EXTENDED RELEASE ORAL DAILY
Status: DISCONTINUED | OUTPATIENT
Start: 2017-03-02 | End: 2017-03-03 | Stop reason: HOSPADM

## 2017-03-02 RX ORDER — LABETALOL HYDROCHLORIDE 5 MG/ML
80 INJECTION, SOLUTION INTRAVENOUS ONCE
Status: COMPLETED | OUTPATIENT
Start: 2017-03-02 | End: 2017-03-02

## 2017-03-02 RX ORDER — LABETALOL HYDROCHLORIDE 5 MG/ML
40 INJECTION, SOLUTION INTRAVENOUS ONCE
Status: COMPLETED | OUTPATIENT
Start: 2017-03-02 | End: 2017-03-02

## 2017-03-02 RX ORDER — MAGNESIUM SULFATE HEPTAHYDRATE 40 MG/ML
2 INJECTION, SOLUTION INTRAVENOUS ONCE
Status: COMPLETED | OUTPATIENT
Start: 2017-03-02 | End: 2017-03-02

## 2017-03-02 RX ORDER — SODIUM CHLORIDE, SODIUM LACTATE, POTASSIUM CHLORIDE, CALCIUM CHLORIDE 600; 310; 30; 20 MG/100ML; MG/100ML; MG/100ML; MG/100ML
1000 INJECTION, SOLUTION INTRAVENOUS CONTINUOUS
Status: ACTIVE | OUTPATIENT
Start: 2017-03-02 | End: 2017-03-02

## 2017-03-02 RX ORDER — LABETALOL HYDROCHLORIDE 5 MG/ML
INJECTION, SOLUTION INTRAVENOUS
Status: COMPLETED
Start: 2017-03-02 | End: 2017-03-02

## 2017-03-02 RX ORDER — OXYCODONE AND ACETAMINOPHEN 5; 325 MG/1; MG/1
1 TABLET ORAL EVERY 4 HOURS PRN
Status: DISCONTINUED | OUTPATIENT
Start: 2017-03-02 | End: 2017-03-03 | Stop reason: HOSPADM

## 2017-03-02 RX ORDER — CALCIUM GLUCONATE 98 MG/ML
1 INJECTION, SOLUTION INTRAVENOUS
Status: DISCONTINUED | OUTPATIENT
Start: 2017-03-02 | End: 2017-03-03 | Stop reason: HOSPADM

## 2017-03-02 RX ORDER — HYDRALAZINE HYDROCHLORIDE 20 MG/ML
5 INJECTION INTRAMUSCULAR; INTRAVENOUS ONCE
Status: COMPLETED | OUTPATIENT
Start: 2017-03-02 | End: 2017-03-02

## 2017-03-02 RX ORDER — LABETALOL HYDROCHLORIDE 5 MG/ML
20 INJECTION, SOLUTION INTRAVENOUS ONCE
Status: COMPLETED | OUTPATIENT
Start: 2017-03-02 | End: 2017-03-02

## 2017-03-02 RX ORDER — SODIUM CHLORIDE, SODIUM LACTATE, POTASSIUM CHLORIDE, CALCIUM CHLORIDE 600; 310; 30; 20 MG/100ML; MG/100ML; MG/100ML; MG/100ML
1000 INJECTION, SOLUTION INTRAVENOUS CONTINUOUS
Status: DISCONTINUED | OUTPATIENT
Start: 2017-03-02 | End: 2017-03-02

## 2017-03-02 RX ORDER — SULFAMETHOXAZOLE AND TRIMETHOPRIM 800; 160 MG/1; MG/1
1 TABLET ORAL 2 TIMES DAILY
Status: DISCONTINUED | OUTPATIENT
Start: 2017-03-02 | End: 2017-03-03 | Stop reason: HOSPADM

## 2017-03-02 RX ORDER — BUTALBITAL, ACETAMINOPHEN AND CAFFEINE 50; 325; 40 MG/1; MG/1; MG/1
1 TABLET ORAL EVERY 4 HOURS PRN
Status: DISCONTINUED | OUTPATIENT
Start: 2017-03-02 | End: 2017-03-03

## 2017-03-02 RX ORDER — MAGNESIUM SULFATE HEPTAHYDRATE 40 MG/ML
2 INJECTION, SOLUTION INTRAVENOUS CONTINUOUS
Status: DISCONTINUED | OUTPATIENT
Start: 2017-03-02 | End: 2017-03-03 | Stop reason: HOSPADM

## 2017-03-02 RX ORDER — ONDANSETRON 8 MG/1
8 TABLET, ORALLY DISINTEGRATING ORAL ONCE
Status: COMPLETED | OUTPATIENT
Start: 2017-03-02 | End: 2017-03-02

## 2017-03-02 RX ADMIN — MAGNESIUM SULFATE IN WATER 2 G: 40 INJECTION, SOLUTION INTRAVENOUS at 12:03

## 2017-03-02 RX ADMIN — LABETALOL HYDROCHLORIDE 40 MG: 5 INJECTION, SOLUTION INTRAVENOUS at 10:03

## 2017-03-02 RX ADMIN — OXYCODONE HYDROCHLORIDE AND ACETAMINOPHEN 1 TABLET: 5; 325 TABLET ORAL at 04:03

## 2017-03-02 RX ADMIN — MAGNESIUM SULFATE IN WATER 2 G/HR: 40 INJECTION, SOLUTION INTRAVENOUS at 01:03

## 2017-03-02 RX ADMIN — LABETALOL HYDROCHLORIDE 20 MG: 5 INJECTION, SOLUTION INTRAVENOUS at 09:03

## 2017-03-02 RX ADMIN — BUTALBITAL, ACETAMINOPHEN AND CAFFEINE 1 TABLET: 50; 325; 40 TABLET ORAL at 08:03

## 2017-03-02 RX ADMIN — SODIUM CHLORIDE, SODIUM LACTATE, POTASSIUM CHLORIDE, AND CALCIUM CHLORIDE 1000 ML: .6; .31; .03; .02 INJECTION, SOLUTION INTRAVENOUS at 12:03

## 2017-03-02 RX ADMIN — SULFAMETHOXAZOLE AND TRIMETHOPRIM 1 TABLET: 800; 160 TABLET ORAL at 09:03

## 2017-03-02 RX ADMIN — NIFEDIPINE 30 MG: 30 TABLET, FILM COATED, EXTENDED RELEASE ORAL at 10:03

## 2017-03-02 RX ADMIN — LABETALOL HYDROCHLORIDE 80 MG: 5 INJECTION, SOLUTION INTRAVENOUS at 11:03

## 2017-03-02 RX ADMIN — HYDRALAZINE HYDROCHLORIDE 5 MG: 20 INJECTION INTRAMUSCULAR; INTRAVENOUS at 12:03

## 2017-03-02 RX ADMIN — SULFAMETHOXAZOLE AND TRIMETHOPRIM 1 TABLET: 800; 160 TABLET ORAL at 01:03

## 2017-03-02 RX ADMIN — ONDANSETRON 8 MG: 8 TABLET, ORALLY DISINTEGRATING ORAL at 08:03

## 2017-03-02 NOTE — IP AVS SNAPSHOT
[facility] Location ([facility])  [address] [place], [place] [address]  Phone: [phone]           Patient Discharge Instructions     Our goal is to set you up for success. This packet includes information on your condition, medications, and your home care. It will help you to care for yourself so you don't get sicker and need to go back to the hospital.     Please ask your nurse if you have any questions.        There are many details to remember when preparing to leave the hospital. Here is what you will need to do:    1. Take your medicine. If you are prescribed medications, review your Medication List in the following pages. You may have new medications to  at the pharmacy and others that you'll need to stop taking. Review the instructions for how and when to take your medications. Talk with your doctor or nurses if you are unsure of what to do.     2. Go to your follow-up appointments. Specific follow-up information is listed in the following pages. Your may be contacted by a transition nurse or clinical provider about future appointments. Be sure we have all of the phone numbers to reach you, if needed. Please contact your provider's office if you are unable to make an appointment.     3. Watch for warning signs. Your doctor or nurse will give you detailed warning signs to watch for and when to call for assistance. These instructions may also include educational information about your condition. If you experience any of warning signs to your health, call your doctor.               [facility]  Unless otherwise directed by your provider, please contact [facility], our nurse care line that is available for 24/7 assistance.     [phone] (toll-free)    Registered nurses in the [facility] provide clinical advisement, health education, appointment booking, and other advisory services.                    ** Verify the list of medication(s) below is accurate and up to  date. Carry this with you in case of emergency. If your medications have changed, please notify your healthcare provider.             Medication List      START taking these medications        Additional Info                      nifedipine 30 MG ORAL TR24 30 MG (OSM) 24 hr tablet   Commonly known as:  PROCARDIA-XL   Quantity:  30 tablet   Refills:  5   Dose:  30 mg    Last time this was given:  30 mg on 3/3/2017  8:20 AM   Instructions:  Take 1 tablet (30 mg total) by mouth once daily.     Begin Date    AM    Noon    PM    Bedtime       sulfamethoxazole-trimethoprim 800-160mg 800-160 mg Tab   Commonly known as:  BACTRIM DS   Quantity:  20 tablet   Refills:  0   Dose:  1 tablet   Indications:  Skin & soft tissue    Last time this was given:  1 tablet on 3/3/2017  8:22 AM   Instructions:  Take 1 tablet by mouth 2 (two) times daily.     Begin Date    AM    Noon    PM    Bedtime         CONTINUE taking these medications        Additional Info                      ibuprofen 600 MG tablet   Commonly known as:  ADVIL,MOTRIN   Quantity:  30 tablet   Refills:  2   Dose:  600 mg    Instructions:  Take 1 tablet (600 mg total) by mouth 3 (three) times daily.     Begin Date    AM    Noon    PM    Bedtime       oxycodone-acetaminophen 5-325 mg per tablet   Commonly known as:  PERCOCET   Quantity:  20 tablet   Refills:  0   Dose:  1 tablet    Last time this was given:  1 tablet on 3/2/2017  4:10 PM   Instructions:  Take 1 tablet by mouth every 4 (four) hours as needed.     Begin Date    AM    Noon    PM    Bedtime       prenatal #108-iron,carbonyl-FA 30-1 mg Tab   Quantity:  30 tablet   Refills:  6   Dose:  1 tablet    Instructions:  Take 1 tablet by mouth once daily.     Begin Date    AM    Noon    PM    Bedtime            Where to Get Your Medications      These medications were sent to [facility]  [address] [facility]  [address] [facility] [address] [address]     Phone:  [phone]      nifedipine 30 MG ORAL TR24 30 MG (OSM) 24 hr tablet    sulfamethoxazole-trimethoprim 800-160mg 800-160 mg Tab                  Please bring to all follow up appointments:    1. A copy of your discharge instructions.  2. All medicines you are currently taking in their original bottles.  3. Identification and insurance card.    Please arrive 15 minutes ahead of scheduled appointment time.    Please call 24 hours in advance if you must reschedule your appointment and/or time.        Your Scheduled Appointments     Mar 14, 2017  9:15 AM CDT   Post Partum with Sekou Sims MD   [facility] ([facility])    [address] [id number] [phone]              Follow-up Information     Follow up with Sekou Sims MD In 1 week.    Specialties:  Obstetrics, Obstetrics and Gynecology    Why:  BP check    Contact information:    [address] [place] [address] [phone]          Follow up with Sekou Sims MD.    Specialties:  Obstetrics, Obstetrics and Gynecology    Why:  6-week post-partum check-up     Contact information:    [address] [place] [address] [phone]          Discharge Instructions     Future Orders    Activity as tolerated     Call MD for:  difficulty breathing or increased cough     Call MD for:  increased confusion or weakness     Call MD for:  persistent dizziness, light-headedness, or visual disturbances     Call MD for:  persistent nausea and vomiting or diarrhea     Call MD for:  redness, tenderness, or signs of infection (pain, swelling, redness, odor or green/yellow discharge around incision site)     Call MD for:  severe persistent headache     Call MD for:  severe uncontrolled pain     Call MD for:  temperature >100.4     Call MD for:  worsening rash     Diet general     Questions:    Total calories:      Fat restriction, if any:      Protein restriction, if any:      Na restriction, if any:      Fluid  "restriction:      Additional restrictions:          Primary Diagnosis     Your primary diagnosis was:  Severely Increased Blood Pressure And Swelling During Pregnancy      Admission Information     Date & Time Provider Department CSN    3/2/2017  7:34 AM Sekou Sims MD [facility] [id number]      Care Providers     Provider Role Specialty Primary office phone    Sekou Sims MD Attending Provider Obstetrics [phone]    Gracy Obrien MD Consulting Physician  Obstetrics and Gynecology [phone]      Your Vitals Were     BP Pulse Temp Resp Height Weight    119/62 90 99.2 °F (37.3 °C) (Oral) 18 5' 6" (1.676 m) 99.8 kg (220 lb)    Last Period SpO2 BMI          06/19/2016 98% 35.51 kg/m2        Recent Lab Values     No lab values to display.      Allergies as of 3/3/2017     No Known Allergies      Advance Directives     An advance directive is a document which, in the event you are no longer able to make decisions for yourself, tells your healthcare team what kind of treatment you do or do not want to receive, or who you would like to make those decisions for you.  If you do not currently have an advance directive, [facility] encourages you to create one.  For more information call:  [phone]), [phone] ([phone]),  or log on to [email or web address].        Smoking Cessation     If you would like to quit smoking:   You may be eligible for free services if you are a [place] resident and started smoking cigarettes before September 1, 1988.  Call the Smoking Cessation Trust (SCT) toll free at (038) 075-7222 or (762) 304-7980.   Call 0-831-QUIT-NOW if you do not meet the above criteria.            Language Assistance Services     ATTENTION: Language assistance services are available, free of charge. Please call [phone].      ATENCIÓN: Si habla español, tiene a kent disposición servicios gratuitos de asistencia lingüística. Llame al " [phone].     KYE Ý: N?u b?n nói Ti?ng Vi?t, có các d?ch v? h? tr? ngôn ng? mi?n phí dành cho b?n. G?i s? [phone].         [facility] complies with applicable Federal civil rights laws and does not discriminate on the basis of race, color, national origin, age, disability, or sex.

## 2017-03-02 NOTE — ED PROVIDER NOTES
Encounter Date: 3/2/2017       History     Chief Complaint   Patient presents with    Nausea    Fever     Review of patient's allergies indicates:  No Known Allergies  HPI Comments: Dao Holt is a 36 y.o. [id number] PPD#6 s/p 1LTCS 2/2 failure to progress (6cm for 9.5 hours) after PPROM at 35w4d. Patient states she started feeling nauseated this AM in addition to fever at home (100.5) and headache.  She states she never took tylenol for the fever and has not taken percocet this this fever.    She also reports headache that started yesterday, mildly relieved wit PO pain medications ibuprofen and percocet but has persisted until today. She denies scotoma, denies increased SOB, denies CP.  She has no history of cHTN, has never been on medication for HTN.   Patient also reports that her incision recently started weeping this AM. She is covering incision with pad, states her last pad was full with yellow, watery fluid. She states there was an odor as well. Reports incision is tender to palpation.       The history is provided by the patient.     No past medical history on file.  No past surgical history on file.  Family History   Problem Relation Age of Onset    Breast cancer Mother     Breast cancer Maternal Aunt     Colon cancer Neg Hx     Ovarian cancer Neg Hx      Social History   Substance Use Topics    Smoking status: Former Smoker    Smokeless tobacco: Not on file    Alcohol use No     Review of Systems   Constitutional: Negative for fever.   HENT: Negative for sore throat.    Respiratory: Negative for shortness of breath.    Cardiovascular: Positive for leg swelling. Negative for chest pain.   Gastrointestinal: Positive for nausea. Negative for diarrhea and vomiting.   Genitourinary: Negative for dysuria.   Musculoskeletal: Negative for back pain.   Skin: Negative for rash.   Neurological: Positive for headaches. Negative for weakness.   Hematological: Does not bruise/bleed easily.       Physical  Exam   Initial Vitals   BP Pulse Resp Temp SpO2   03/02/17 0745 03/02/17 0745 03/02/17 0745 03/02/17 0745 03/02/17 0745   158/79 77 20 99 °F (37.2 °C) 97 %     Physical Exam    Nursing note and vitals reviewed.  Constitutional: She appears well-developed and well-nourished. She is not diaphoretic. No distress.   HENT:   Head: Normocephalic and atraumatic.   Eyes: Conjunctivae and EOM are normal.   Neck: Normal range of motion.   Cardiovascular: Normal rate.   Pulmonary/Chest: No respiratory distress.   Abdominal:       Musculoskeletal: Normal range of motion.   Neurological: She is alert and oriented to person, place, and time.   Skin: Skin is warm and dry.   2+ pitting edema to knee bilaterally   Psychiatric: She has a normal mood and affect.         ED Course   Procedures  Labs Reviewed   CBC W/ AUTO DIFFERENTIAL - Abnormal; Notable for the following:        Result Value    RBC 3.35 (*)     Hemoglobin 10.0 (*)     Hematocrit 30.0 (*)     Platelets 434 (*)     MPV 8.4 (*)     Gran # 8.7 (*)     Lymph # 0.9 (*)     Mono # 1.2 (*)     Gran% 79.4 (*)     Lymph% 7.9 (*)     All other components within normal limits   COMPREHENSIVE METABOLIC PANEL - Abnormal; Notable for the following:     Albumin 2.5 (*)     All other components within normal limits   LACTATE DEHYDROGENASE - Abnormal; Notable for the following:      (*)     All other components within normal limits   URINALYSIS - Abnormal; Notable for the following:     Specific Gravity, UA <=1.005 (*)     Occult Blood UA Trace (*)     Leukocytes, UA Trace (*)     All other components within normal limits   URIC ACID   PROTEIN / CREATININE RATIO, URINE   INFLUENZA A AND B ANTIGEN   URINALYSIS MICROSCOPIC   POCT URINALYSIS, DIPSTICK OR TABLET REAGENT, AUTOMATED, WITH MICROSCOP             Medical Decision Making:   History:   Old Medical Records: I decided to obtain old medical records.  Old Records Summarized: records from clinic visits.  Initial Assessment:   36  year old PPD6 s/p LTCS 2/2 failure to progress after PPROM at 35.4 presenting with elevated BPs and HA.    Differential Diagnosis:   PreE w/ SF  ED Management:  PP PreE  - Zofran ODT 8mg- improved  - Fioricet for HA- improved although still present 8/10 --> 2/10  - CBC: 10/10/30/434  - CMP WNL  - LDH WNL  - Uric Acid WNL  - P:C: Too low to calculate  - BP q15             - labetalol 20mg IV, 40 mg IV, procardia 30 XL, labetalol 80 mg             - BPs remained in severe range             - Will admit for BP control and MgSO4    Wound Cellulitis  - mild erythema, foul odor, and TTP  - consider starting bactrim  Other:   I have discussed this case with another health care provider.                   ED Course     Clinical Impression:   The encounter diagnosis was Preeclampsia, severe.    Disposition:   Disposition: Admitted  Condition: Stable       Irish العراقي MD  Resident  03/02/17 1210

## 2017-03-02 NOTE — ED NOTES
Pt presents to [facility] c/o N/V, with fever and chills. Pt placed in [facility] 1. Oriented to room,placed in gown. V/S per flowsheet. DR Pelaez notified of her arrival

## 2017-03-02 NOTE — H&P
History and Physical                                            Obstetrics          Subjective:      Dao Holt is a 36 y.o. [id number] PPD#6 s/p 1LTCS 2/2 failure to progress (6cm for 9.5 hours) after PPROM at 35w4d. Patient states she started feeling nauseated this AM in addition to fever at home (100.5) and headache. She states she never took tylenol for the fever and has not taken percocet this this fever.   She also reports headache that started yesterday, mildly relieved wit PO pain medications ibuprofen and percocet but has persisted until today. She denies scotoma, denies increased SOB, denies CP. She has no history of cHTN, has never been on medication for HTN.   Patient also reports that her incision recently started weeping this AM. She is covering incision with pad, states her last pad was full with yellow, watery fluid. She states there was an odor as well. Reports incision is tender to palpation.     PMHx: No past medical history on file.    PSHx: No past surgical history on file.    All: Review of patient's allergies indicates:  No Known Allergies    Meds:   Prescriptions Prior to Admission   Medication Sig Dispense Refill Last Dose    ibuprofen (ADVIL,MOTRIN) 600 MG tablet Take 1 tablet (600 mg total) by mouth 3 (three) times daily. 30 tablet 2 3/2/2017    oxycodone-acetaminophen (PERCOCET) 5-325 mg per tablet Take 1 tablet by mouth every 4 (four) hours as needed. 20 tablet 0     prenatal #108-iron,carbonyl-FA 30-1 mg Tab Take 1 tablet by mouth once daily. 30 tablet 6 Taking       SH:   Social History     Social History    Marital status:      Spouse name: N/A    Number of children: N/A    Years of education: N/A     Occupational History    Not on file.     Social History Main Topics    Smoking status: Former Smoker    Smokeless tobacco: Not on file    Alcohol use No    Drug use: No    Sexual activity: Not Currently     Other Topics Concern    Not on file     Social History  "Narrative       FH:   Family History   Problem Relation Age of Onset    Breast cancer Mother     Breast cancer Maternal Aunt     Colon cancer Neg Hx     Ovarian cancer Neg Hx        OBHx:   Obstetric History       T0      TAB0   SAB0   E0   M0   L1       # Outcome Date GA Lbr Martinez/2nd Weight Sex Delivery Anes PTL Lv   1  17 35w4d  2.608 kg (5 lb 12 oz) M CS-LTranv EPI Y Y      Name: SE,BOY PAUL      Complications: Failure to Progress in Second Stage      Apgar1:  6                Apgar5: 7            Review of Systems:  Constitutional: Negative for fever.   HENT: Negative for sore throat.   Respiratory: Negative for shortness of breath.   Cardiovascular: Positive for leg swelling. Negative for chest pain.   Gastrointestinal: Positive for nausea. Negative for diarrhea and vomiting.   Genitourinary: Negative for dysuria.   Musculoskeletal: Negative for back pain.   Skin: Negative for rash.   Neurological: Positive for headaches. Negative for weakness.   Hematological: Does not bruise/bleed easily.     Objective:       BP (!) 179/93  Pulse 66  Temp 97.5 °F (36.4 °C)  Resp 20  Ht 5' 6" (1.676 m)  Wt 99.8 kg (220 lb)  LMP 2016  SpO2 100%  BMI 35.51 kg/m2    Temp:  [97.5 °F (36.4 °C)-99 °F (37.2 °C)] 97.5 °F (36.4 °C)  Pulse:  [52-77] 66  Resp:  [20] 20  SpO2:  [94 %-100 %] 100 %  BP: (150-187)/(70-98) 179/93     Constitutional: She appears well-developed and well-nourished. She is not diaphoretic. No distress.   HENT:   Head: Normocephalic and atraumatic.   Eyes: Conjunctivae and EOM are normal.   Neck: Normal range of motion.   Cardiovascular: Normal rate.   Pulmonary/Chest: No respiratory distress.   Abdominal:       Musculoskeletal: Normal range of motion.   Neurological: She is alert and oriented to person, place, and time.   Skin: Skin is warm and dry.   2+ pitting edema to knee bilaterally   Psychiatric: She has a normal mood and affect.     Labs:  - CBC: " 10/10/30/434  - CMP WNL  - LDH WNL  - Uric Acid WNL  - P:C: Too low to calculate       Assessment:       35w4d weeks gestation admitted for Preeclampsia, severe    Active Hospital Problems    Diagnosis  POA    *Preeclampsia, severe [O14.10]  Yes      Resolved Hospital Problems    Diagnosis Date Resolved POA   No resolved problems to display.          Plan:          Preeclampsia, severe  - Zofran ODT 8mg- improved  - Fioricet for HA- improved although still present 8/10 --> 2/10  - CBC: 10/10/30/434  - CMP WNL  - LDH WNL  - Uric Acid WNL  - P:C: Too low to calculate  - BP q15   - s/p labetalol 20mg IV, 40 mg IV, procardia 30 XL, labetalol 80 mg  - BPs remained in severe range  - BP: (150-187)/(70-98) 179/93  - Start MgSO4 for seizure ppx     Wound Cellulitis  - mild erythema, foul odor, and TTP  - Will start bactrim    Irish العراقي MD

## 2017-03-02 NOTE — LETTER
[address]  [facility] [address]  Phone: [phone]           March 2, 2017    Re: Dao Holt      To Whom It May Concern:    This notice verifies that Dao Holt recently admitted to [facility] on 2/23/17 and delivered a new born infant on 2/24 and subsequently discharged from the hospital on 2/27/17. Patient was readmitted on 3/2 for further medical complications related to her pregnancy. She states she has required the assistance of her  through this time. Please contact me with any questions      Sincerely,      Xiang Shaikh MD

## 2017-03-02 NOTE — PROGRESS NOTES
Magnesium Assessment Note    Subjective:         Dao Holt is a 36 y.o. female PPD6 s/p 1LTCS 2/2 failure to progress (6cm for 9.5 hours) after PPROM at 35w4d with uncontrollable severe range BPs in [place], now on IV MgSO4 for seizure prophylaxis.    Patient reports HA that is coming and going, she states the HA is not currently present. She otherwise blurry vision and denies right upper quadrant pain. Denies CP, denies SOB. Patient reports no nausea/no vomiting.     Objective:      Temp:  [97.5 °F (36.4 °C)-99 °F (37.2 °C)] 98.3 °F (36.8 °C)  Pulse:  [] 94  Resp:  [18-20] 18  SpO2:  [94 %-100 %] 99 %  BP: (113-187)/(58-98) 128/58       I/O this shift:  In: -   Out: 650 [Urine:650]    General:   alert, appears stated age and cooperative   Lungs:   clear to auscultation bilaterally   Heart::   regular rate and rhythm, S1, S2 normal, no murmur, click, rub or gallop   Extremities: peripheral pulses normal, no pedal edema, no clubbing or cyanosis   DTRs- 2+  right brachioradialis       Lab Review  Recent Results (from the past 24 hour(s))   CBC auto differential    Collection Time: 03/02/17  8:00 AM   Result Value Ref Range    WBC 10.91 3.90 - 12.70 K/uL    RBC 3.35 (L) 4.00 - 5.40 M/uL    Hemoglobin 10.0 (L) 12.0 - 16.0 g/dL    Hematocrit 30.0 (L) 37.0 - 48.5 %    MCV 90 82 - 98 fL    MCH 29.9 27.0 - 31.0 pg    MCHC 33.3 32.0 - 36.0 %    RDW 12.6 11.5 - 14.5 %    Platelets 434 (H) 150 - 350 K/uL    MPV 8.4 (L) 9.2 - 12.9 fL    Gran # 8.7 (H) 1.8 - 7.7 K/uL    Lymph # 0.9 (L) 1.0 - 4.8 K/uL    Mono # 1.2 (H) 0.3 - 1.0 K/uL    Eos # 0.1 0.0 - 0.5 K/uL    Baso # 0.01 0.00 - 0.20 K/uL    Gran% 79.4 (H) 38.0 - 73.0 %    Lymph% 7.9 (L) 18.0 - 48.0 %    Mono% 11.2 4.0 - 15.0 %    Eosinophil% 0.5 0.0 - 8.0 %    Basophil% 0.1 0.0 - 1.9 %    Differential Method Automated    Comprehensive metabolic panel    Collection Time: 03/02/17  8:00 AM   Result Value Ref Range    Sodium 140 136 - 145 mmol/L    Potassium 4.4 3.5 -  5.1 mmol/L    Chloride 107 95 - 110 mmol/L    CO2 23 23 - 29 mmol/L    Glucose 80 70 - 110 mg/dL    BUN, Bld 9 6 - 20 mg/dL    Creatinine 0.8 0.5 - 1.4 mg/dL    Calcium 9.1 8.7 - 10.5 mg/dL    Total Protein 7.3 6.0 - 8.4 g/dL    Albumin 2.5 (L) 3.5 - 5.2 g/dL    Total Bilirubin 0.4 0.1 - 1.0 mg/dL    Alkaline Phosphatase 91 55 - 135 U/L    AST 30 10 - 40 U/L    ALT 23 10 - 44 U/L    Anion Gap 10 8 - 16 mmol/L    eGFR if African American >60 >60 mL/min/1.73 m^2    eGFR if non African American >60 >60 mL/min/1.73 m^2   Lactate dehydrogenase    Collection Time: 03/02/17  8:00 AM   Result Value Ref Range     (H) 110 - 260 U/L   Uric acid    Collection Time: 03/02/17  8:00 AM   Result Value Ref Range    Uric Acid 5.6 2.4 - 5.7 mg/dL   Protein / creatinine ratio, urine    Collection Time: 03/02/17  8:01 AM   Result Value Ref Range    Protein, Urine Random <7 0 - 15 mg/dL    Creatinine, Random Ur 19.6 15.0 - 325.0 mg/dL    Prot/Creat Ratio, Ur Unable to calculate 0.00 - 0.20   Urinalysis    Collection Time: 03/02/17  8:01 AM   Result Value Ref Range    Specimen UA Urine, Catheterized     Color, UA Yellow Yellow, Straw, E[place]se    Appearance, UA Clear Clear    pH, UA 6.0 5.0 - 8.0    Specific Gravity, UA <=1.005 (A) 1.005 - 1.030    Protein, UA Negative Negative    Glucose, UA Negative Negative    Ketones, UA Negative Negative    Bilirubin (UA) Negative Negative    Occult Blood UA Trace (A) Negative    Nitrite, UA Negative Negative    Urobilinogen, UA Negative <2.0 EU/dL    Leukocytes, UA Trace (A) Negative   Urinalysis Microscopic    Collection Time: 03/02/17  8:01 AM   Result Value Ref Range    RBC, UA 3 0 - 4 /hpf    WBC, UA 1 0 - 5 /hpf    Bacteria, UA Rare None-Occ /hpf    Squam Epithel, UA 4 /hpf    Microscopic Comment SEE COMMENT    Influenza antigen Nasal Swab    Collection Time: 03/02/17  8:23 AM   Result Value Ref Range    Influenza A Ag, EIA Negative Negative    Influenza B Ag, EIA Negative Negative    Flu A  & B Source Nasal Swab    POCT urinalysis, dipstick or tablet reag    Collection Time: 17  8:29 AM   Result Value Ref Range    Color yellow     Spec Grav 1.005     pH, UA 6     WBC, UA trace     Nitrite -     Protein -     Glucose, UA -     Ketones, UA -     Urobilinogen norm     Bilirubin -     Blood, UA trace         Assessment:     36 y.o.  female at 35w4d, on IV magnesium sulfate.    Active Hospital Problems    Diagnosis  POA    *Preeclampsia, severe [O14.10]  Yes      Resolved Hospital Problems    Diagnosis Date Resolved POA   No resolved problems to display.        Plan:     - Continue magnesium sulfate, no signs of toxicity at this time  - Close maternal monitoring including UOP and BP  - BP: (113-187)/(58-98) 128/58   - s/p labetalol 20/40/80, hydralazine 5mg, procardia 30 XL  - UOP: 300-350 cc/hr  - Recheck in 4 hours or PRN      Irish العراقي MD

## 2017-03-03 VITALS
BODY MASS INDEX: 35.36 KG/M2 | SYSTOLIC BLOOD PRESSURE: 119 MMHG | TEMPERATURE: 100 F | HEART RATE: 90 BPM | OXYGEN SATURATION: 98 % | DIASTOLIC BLOOD PRESSURE: 62 MMHG | RESPIRATION RATE: 18 BRPM | HEIGHT: 66 IN | WEIGHT: 220 LBS

## 2017-03-03 PROBLEM — T81.49XA WOUND INFECTION AFTER SURGERY: Status: ACTIVE | Noted: 2017-03-03

## 2017-03-03 PROBLEM — O42.919 PRETERM PREMATURE RUPTURE OF MEMBRANES (PPROM) DELIVERED, CURRENT HOSPITALIZATION: Status: RESOLVED | Noted: 2017-02-22 | Resolved: 2017-03-03

## 2017-03-03 PROCEDURE — 25000003 PHARM REV CODE 250: Performed by: STUDENT IN AN ORGANIZED HEALTH CARE EDUCATION/TRAINING PROGRAM

## 2017-03-03 PROCEDURE — 99232 SBSQ HOSP IP/OBS MODERATE 35: CPT | Mod: ,,, | Performed by: OBSTETRICS & GYNECOLOGY

## 2017-03-03 PROCEDURE — 25000003 PHARM REV CODE 250: Performed by: OBSTETRICS & GYNECOLOGY

## 2017-03-03 RX ORDER — SULFAMETHOXAZOLE AND TRIMETHOPRIM 800; 160 MG/1; MG/1
1 TABLET ORAL 2 TIMES DAILY
Qty: 20 TABLET | Refills: 0 | Status: SHIPPED | OUTPATIENT
Start: 2017-03-03 | End: 2017-03-03

## 2017-03-03 RX ORDER — DIPHENHYDRAMINE HCL 25 MG
25 CAPSULE ORAL EVERY 6 HOURS PRN
Status: DISCONTINUED | OUTPATIENT
Start: 2017-03-03 | End: 2017-03-03 | Stop reason: HOSPADM

## 2017-03-03 RX ORDER — NIFEDIPINE 30 MG/1
30 TABLET, EXTENDED RELEASE ORAL DAILY
Qty: 30 TABLET | Refills: 5 | Status: ON HOLD | OUTPATIENT
Start: 2017-03-03 | End: 2017-03-08 | Stop reason: HOSPADM

## 2017-03-03 RX ORDER — ACETAMINOPHEN 325 MG/1
650 TABLET ORAL EVERY 6 HOURS PRN
Status: DISCONTINUED | OUTPATIENT
Start: 2017-03-03 | End: 2017-03-03 | Stop reason: HOSPADM

## 2017-03-03 RX ORDER — ACETAMINOPHEN 325 MG/1
650 TABLET ORAL ONCE
Status: COMPLETED | OUTPATIENT
Start: 2017-03-03 | End: 2017-03-03

## 2017-03-03 RX ADMIN — SULFAMETHOXAZOLE AND TRIMETHOPRIM 1 TABLET: 800; 160 TABLET ORAL at 08:03

## 2017-03-03 RX ADMIN — BUTALBITAL, ACETAMINOPHEN AND CAFFEINE 1 TABLET: 50; 325; 40 TABLET ORAL at 01:03

## 2017-03-03 RX ADMIN — ACETAMINOPHEN 650 MG: 325 TABLET ORAL at 06:03

## 2017-03-03 RX ADMIN — DIPHENHYDRAMINE HYDROCHLORIDE 25 MG: 25 CAPSULE ORAL at 01:03

## 2017-03-03 RX ADMIN — NIFEDIPINE 30 MG: 30 TABLET, FILM COATED, EXTENDED RELEASE ORAL at 08:03

## 2017-03-03 NOTE — PROGRESS NOTES
Magnesium Assessment Note    Subjective:         Dao Holt is a 36 y.o. female POD#7 s/p 1LTCS 2/2 failure to progress (6cm for 9.5 hours) after PPROM at 35w4d with uncontrollable severe range BPs in [place], now on IV MgSO4 for seizure prophylaxis.    Patient reports HA still comes and goes, never completely resolves. Has tried several meds with minimal improvement. HA is currently present but mild. Declines mediations at this time and BP at bedside is 126/72.     She denies blurry vision and denies right upper quadrant pain. Denies CP, denies SOB. Patient reports no nausea/no vomiting.     Objective:      Temp:  [98.3 °F (36.8 °C)-99.7 °F (37.6 °C)] 99.2 °F (37.3 °C)  Pulse:  [] 88  Resp:  [18] 18  SpO2:  [90 %-100 %] 96 %  BP: (110-180)/(55-93) 126/72    I/O last 3 completed shifts:  In: 723.3 [I.V.:723.3]  Out: 4475 [Urine:4475]  I/O this shift:  In: 145.8 [I.V.:145.8]  Out: 1830 [Urine:1830]    General:   alert, appears stated age and cooperative   Lungs:   clear to auscultation bilaterally   Heart::   regular rate and rhythm, S1, S2 normal, no murmur, click, rub or gallop   Extremities: peripheral pulses normal, no pedal edema, no clubbing or cyanosis   DTRs- 2+  bilateral brachioradialis     Lab Review  No results found for this or any previous visit (from the past 24 hour(s)).     Assessment:     36 y.o.  female at PPD#7 s/p LTCS, on IV magnesium sulfate.    Active Hospital Problems    Diagnosis  POA    *Preeclampsia, severe [O14.10]  Yes    Wound infection after surgery [T81.4XXA]  Yes    S/P  section [Z98.891]  Not Applicable      Resolved Hospital Problems    Diagnosis Date Resolved POA   No resolved problems to display.     Plan:     Postpartum PreE with SF (BP, HA), on MgSO4  - P/C too low to calculate,   - Continue magnesium sulfate until 1230, no signs of toxicity at this time  - Close maternal monitoring including UOP and BP   - BP: (110-180)/(55-93) 126/72    -  cc  in last 2hr  - BP meds   - s/p labetalol 20/40/80, hydralazine 5mg on admit   - Continue Procardia 30 XL  - Recheck in 4 hours or PRN    Tootie Fletcher MD, PhD  [facility], PGY-1

## 2017-03-03 NOTE — PROGRESS NOTES
Magnesium sulfate discontinued at 1230. Patient with no complaints at this time. BP is well controlled.    Temp:  [98.3 °F (36.8 °C)-99.7 °F (37.6 °C)] 99.2 °F (37.3 °C)  Pulse:  [] 82  Resp:  [18] 18  SpO2:  [90 %-100 %] 99 %  BP: (110-166)/(55-89) 119/64     Postpartum PreE with SF (BP, HA), on MgSO4  - P/C too low to calculate,   - no signs of toxicity at this time  - Close maternal monitoring including UOP and BP  - BP: (110-166)/(55-89) 119/64   - UOP 1050cc in last 3hr  - s/p labetalol 20/40/80, hydralazine 5mg on admit  - Continue Procardia 30 XL  - DISCONTINUE MgSO4 at this time    Tootie Fletcher MD, PhD  [facility], PGY-1

## 2017-03-03 NOTE — PROGRESS NOTES
Magnesium Assessment Note    Subjective:         Dao Holt is a 36 y.o. female POD7 s/p 1LTCS 2/2 failure to progress (6cm for 9.5 hours) after PPROM at 35w4d with uncontrollable severe range BPs in [place], now on IV MgSO4 for seizure prophylaxis.    Patient reports HA that is coming and going, she states the HA is not currently present. She denies blurry vision and denies right upper quadrant pain. Denies CP, denies SOB. Patient reports no nausea/no vomiting.     Objective:      Temp:  [97.5 °F (36.4 °C)-99 °F (37.2 °C)] 98.3 °F (36.8 °C)  Pulse:  [] 84  Resp:  [18-20] 18  SpO2:  [93 %-100 %] 100 %  BP: (110-187)/(55-98) 117/66    I/O last 3 completed shifts:  In: -   Out: 1600 [Urine:1600]       General:   alert, appears stated age and cooperative   Lungs:   clear to auscultation bilaterally   Heart::   regular rate and rhythm, S1, S2 normal, no murmur, click, rub or gallop   Extremities: peripheral pulses normal, no pedal edema, no clubbing or cyanosis   DTRs- 2+  left brachioradialis       Lab Review  Recent Results (from the past 24 hour(s))   CBC auto differential    Collection Time: 03/02/17  8:00 AM   Result Value Ref Range    WBC 10.91 3.90 - 12.70 K/uL    RBC 3.35 (L) 4.00 - 5.40 M/uL    Hemoglobin 10.0 (L) 12.0 - 16.0 g/dL    Hematocrit 30.0 (L) 37.0 - 48.5 %    MCV 90 82 - 98 fL    MCH 29.9 27.0 - 31.0 pg    MCHC 33.3 32.0 - 36.0 %    RDW 12.6 11.5 - 14.5 %    Platelets 434 (H) 150 - 350 K/uL    MPV 8.4 (L) 9.2 - 12.9 fL    Gran # 8.7 (H) 1.8 - 7.7 K/uL    Lymph # 0.9 (L) 1.0 - 4.8 K/uL    Mono # 1.2 (H) 0.3 - 1.0 K/uL    Eos # 0.1 0.0 - 0.5 K/uL    Baso # 0.01 0.00 - 0.20 K/uL    Gran% 79.4 (H) 38.0 - 73.0 %    Lymph% 7.9 (L) 18.0 - 48.0 %    Mono% 11.2 4.0 - 15.0 %    Eosinophil% 0.5 0.0 - 8.0 %    Basophil% 0.1 0.0 - 1.9 %    Differential Method Automated    Comprehensive metabolic panel    Collection Time: 03/02/17  8:00 AM   Result Value Ref Range    Sodium 140 136 - 145 mmol/L     Potassium 4.4 3.5 - 5.1 mmol/L    Chloride 107 95 - 110 mmol/L    CO2 23 23 - 29 mmol/L    Glucose 80 70 - 110 mg/dL    BUN, Bld 9 6 - 20 mg/dL    Creatinine 0.8 0.5 - 1.4 mg/dL    Calcium 9.1 8.7 - 10.5 mg/dL    Total Protein 7.3 6.0 - 8.4 g/dL    Albumin 2.5 (L) 3.5 - 5.2 g/dL    Total Bilirubin 0.4 0.1 - 1.0 mg/dL    Alkaline Phosphatase 91 55 - 135 U/L    AST 30 10 - 40 U/L    ALT 23 10 - 44 U/L    Anion Gap 10 8 - 16 mmol/L    eGFR if African American >60 >60 mL/min/1.73 m^2    eGFR if non African American >60 >60 mL/min/1.73 m^2   Lactate dehydrogenase    Collection Time: 03/02/17  8:00 AM   Result Value Ref Range     (H) 110 - 260 U/L   Uric acid    Collection Time: 03/02/17  8:00 AM   Result Value Ref Range    Uric Acid 5.6 2.4 - 5.7 mg/dL   Protein / creatinine ratio, urine    Collection Time: 03/02/17  8:01 AM   Result Value Ref Range    Protein, Urine Random <7 0 - 15 mg/dL    Creatinine, Random Ur 19.6 15.0 - 325.0 mg/dL    Prot/Creat Ratio, Ur Unable to calculate 0.00 - 0.20   Urinalysis    Collection Time: 03/02/17  8:01 AM   Result Value Ref Range    Specimen UA Urine, Catheterized     Color, UA Yellow Yellow, Straw, [place]    Appearance, UA Clear Clear    pH, UA 6.0 5.0 - 8.0    Specific Gravity, UA <=1.005 (A) 1.005 - 1.030    Protein, UA Negative Negative    Glucose, UA Negative Negative    Ketones, UA Negative Negative    Bilirubin (UA) Negative Negative    Occult Blood UA Trace (A) Negative    Nitrite, UA Negative Negative    Urobilinogen, UA Negative <2.0 EU/dL    Leukocytes, UA Trace (A) Negative   Urinalysis Microscopic    Collection Time: 03/02/17  8:01 AM   Result Value Ref Range    RBC, UA 3 0 - 4 /hpf    WBC, UA 1 0 - 5 /hpf    Bacteria, UA Rare None-Occ /hpf    Squam Epithel, UA 4 /hpf    Microscopic Comment SEE COMMENT    Influenza antigen Nasal Swab    Collection Time: 03/02/17  8:23 AM   Result Value Ref Range    Influenza A Ag, EIA Negative Negative    Influenza B Ag, EIA  Negative Negative    Flu A & B Source Nasal Swab    POCT urinalysis, dipstick or tablet reag    Collection Time: 17  8:29 AM   Result Value Ref Range    Color yellow     Spec Grav 1.005     pH, UA 6     WBC, UA trace     Nitrite -     Protein -     Glucose, UA -     Ketones, UA -     Urobilinogen norm     Bilirubin -     Blood, UA trace         Assessment:     36 y.o.  female at 35w4d, on IV magnesium sulfate.    Active Hospital Problems    Diagnosis  POA    *Preeclampsia, severe [O14.10]  Yes      Resolved Hospital Problems    Diagnosis Date Resolved POA   No resolved problems to display.        Plan:     - Continue magnesium sulfate, no signs of toxicity at this time  - Close maternal monitoring including UOP and BP  BP: (110-187)/(55-98) 117/66   - s/p labetalol 20/40/80, hydralazine 5mg, procardia 30 XL  - UOP: >100 cc/hr  - Recheck in 4 hours or PRN      Xiang Shaikh MD

## 2017-03-03 NOTE — PROGRESS NOTES
POSTPARTUM PROGRESS NOTE     Dao Holt is a 36 y.o. female POD #7 status post 1LTCS at 35.4wga 2/2 failure to progress/arrest of dilation @ 6cm x9.5h after PPROM, now admitted for PreE w SF (BP, HA) on MgSO4. Also on Bactrim for cellulitis surrounding her abdominal incision. Patient is doing well this morning.   This morning, patient reports intermittent HA that is currently resolved with Tylenol. She denies vision changes, CP/SOB, RUQ pain, N/V, increasing edema.  Denies further issues with her incision.    Objective:       Temp:  [97.5 °F (36.4 °C)-99.7 °F (37.6 °C)] 99.7 °F (37.6 °C)  Pulse:  [] 90  Resp:  [18-20] 18  SpO2:  [91 %-100 %] 94 %  BP: (110-187)/(55-98) 113/59      General:  alert, appears stated age and cooperative    Lungs:  Crackles on LLL, otherwise clear to auscultation bilaterally    Heart: regular rate and rhythm, S1, S2 normal, no murmur, click, rub or gallop    Abdomen:  Soft, nontender, incision c/d/i without erythema or tenderness    Extremities: peripheral pulses normal, 2+ pedal edema, no clubbing or cyanosis DTRs- 2+     Lab Review  No results found for this or any previous visit (from the past 4 hour(s)).    I/O    Intake/Output Summary (Last 24 hours) at 03// 0618  Last data filed at / 0600   Gross per 24 hour   Intake                0 ml   Output             4475 ml   Net            -4475 ml        Assessment:   POD #7 status post 1LTCS at 35.4wga 2/2 failure to progress/arrest of dilation @ 6cm x9.5h after PPROM, now admitted for PreE w SF (BP, HA) on MgSO4.    Patient Active Problem List   Diagnosis    Elderly multigravida in second trimester    Encounter for supervision of normal first pregnancy in second trimester    Sickle cell trait     premature rupture of membranes (PPROM) delivered, current hospitalization    S/P  section    Preeclampsia, severe        Plan:   1. Postpartum PreE with SF (BP, HA), on MgSO4  - P/C too low to  calculate,   - Continue magnesium sulfate (1230), no signs of toxicity at this time  - Close maternal monitoring including UOP and BP  - BP: (110-187)/(55-98) 117/65  - -400 cc/hr  - s/p labetalol 20/40/80, hydralazine 5mg on admit  - Started on Procardia 30 XL  - Recheck in 4 hours or PRN    2. Cellulitis  - AF, VSS  - Incision c/d/i today, no erythema or tenderness  - Continue Helen Arenas

## 2017-03-03 NOTE — DISCHARGE SUMMARY
Delivery Discharge Summary  Obstetrics      Primary OB Clinician: Sekou Sims MD    Admission date: 3/2/2017  Discharge date: 2017    Disposition: To home, self care    Admit Dx:      Patient Active Problem List   Diagnosis    Sickle cell trait    S/P  section    Preeclampsia, severe    Wound infection after surgery     Discharge Dx:    Patient Active Problem List   Diagnosis    Sickle cell trait    S/P  section    Preeclampsia, severe    Wound infection after surgery       Procedure: None    Hospital Course:  Dao Holt is a 36 y.o. now , PPD #7 who was admitted on 3/2/2017 after presenting to triage complaining of wound tenderness. At this time the patient had severe range blood pressures that required Labetalol 20,40, 80mg IV pushed. She was given procardia 30mg XL then still required Hydralazine 5mg IV. She admitted and started on Magnesium Sulfate IV for seizure prophylaxis. She was also started on Bactrim PO for  incision cellulitis. Pt was in stable condition while on 24 hours of magnesium sulfate. Blood pressures remained 110-120s/60s and UOP remained appropriate. She continues to deny HA, vision changes, F/C, LE swelling. Denies any breast pain/soreness.  Pt in stable condition and ready for discharge. She has been instructed to continue Procardia 30XL daily as indicated as well as pain medications as needed and to follow up in the OB clinic in 1 week for blood pressure check and within 6 weeks with her obstetrics provider.    Pertinent studies:  Postpartum CBC  Lab Results   Component Value Date    WBC 10.91 2017    HGB 10.0 (L) 2017    HCT 30.0 (L) 2017    MCV 90 2017     (H) 2017     Delivery:    Episiotomy:     Lacerations:     Repair suture:     Repair # of packets:     Blood loss (ml): 0     Birth information:  YOB: 2017   Time of birth: 4:48 PM   Sex: male   Delivery type: , Low  Transverse   Gestational Age: 35w4d    Delivery Clinician:      Other providers:       Additional  information:  Forceps:    Vacuum:    Breech:    Observed anomalies      Living?:           APGARS  One minute Five minutes Ten minutes   Skin color:         Heart rate:         Grimace:         Muscle tone:         Breathing:         Totals: 6  7  9      Placenta: Delivered:       appearance      Patient Instructions:   Current Discharge Medication List      START taking these medications    Details   nifedipine 30 MG ORAL TR24 (PROCARDIA-XL) 30 MG (OSM) 24 hr tablet Take 1 tablet (30 mg total) by mouth once daily.  Qty: 30 tablet, Refills: 5    Associated Diagnoses: Cellulitis of abdominal wall; Wound infection after surgery, subsequent encounter; S/P  section      sulfamethoxazole-trimethoprim 800-160mg (BACTRIM DS) 800-160 mg Tab Take 1 tablet by mouth 2 (two) times daily.  Qty: 20 tablet, Refills: 0         CONTINUE these medications which have NOT CHANGED    Details   ibuprofen (ADVIL,MOTRIN) 600 MG tablet Take 1 tablet (600 mg total) by mouth 3 (three) times daily.  Qty: 30 tablet, Refills: 2    Associated Diagnoses:  premature rupture of membranes (PPROM) delivered, current hospitalization;  delivery delivered; S/P  section      oxycodone-acetaminophen (PERCOCET) 5-325 mg per tablet Take 1 tablet by mouth every 4 (four) hours as needed.  Qty: 20 tablet, Refills: 0    Associated Diagnoses:  premature rupture of membranes (PPROM) delivered, current hospitalization;  delivery delivered; S/P  section      prenatal #108-iron,carbonyl-FA 30-1 mg Tab Take 1 tablet by mouth once daily.  Qty: 30 tablet, Refills: 6               Discharge Procedure Orders  Diet general     Call MD for:  temperature >100.4     Activity as tolerated     Call MD for:  persistent nausea and vomiting or diarrhea     Call MD for:  severe uncontrolled pain     Call MD for:  difficulty  breathing or increased cough     Call MD for:  severe persistent headache     Call MD for:  worsening rash     Call MD for:  persistent dizziness, light-headedness, or visual disturbances     Call MD for:  increased confusion or weakness     Call MD for:  redness, tenderness, or signs of infection (pain, swelling, redness, odor or green/yellow discharge around incision site)         Ryan Ortiz MD  PGY 3 OB/GYN  [phone]

## 2017-03-03 NOTE — PROGRESS NOTES
Patient received after visit summary and verbalized understanding of discharge instructions. Patient escorted to vehicle in wheelchair.

## 2017-03-03 NOTE — PROGRESS NOTES
Magnesium Assessment Note    Subjective:         Dao Holt is a 36 y.o. female POD7 s/p 1LTCS 2/2 failure to progress (6cm for 9.5 hours) after PPROM at 35w4d with uncontrollable severe range BPs in [place], now on IV MgSO4 for seizure prophylaxis.    Patient reports HA that is coming and going, she states the HA is currently present. She denies blurry vision and denies right upper quadrant pain. Denies CP, denies SOB. Patient reports no nausea/no vomiting.     Objective:      Temp:  [97.5 °F (36.4 °C)-99.7 °F (37.6 °C)] 99.7 °F (37.6 °C)  Pulse:  [] 90  Resp:  [18-20] 18  SpO2:  [91 %-100 %] 94 %  BP: (110-187)/(55-98) 113/59    I/O last 3 completed shifts:  In: -   Out: 1600 [Urine:1600]  I/O this shift:  In: -   Out: 2100 [Urine:2100]    General:   alert, appears stated age and cooperative   Lungs:   clear to auscultation bilaterally   Heart::   regular rate and rhythm, S1, S2 normal, no murmur, click, rub or gallop   Extremities: peripheral pulses normal, no pedal edema, no clubbing or cyanosis   DTRs- 2+  left brachioradialis       Lab Review  Recent Results (from the past 24 hour(s))   CBC auto differential    Collection Time: 03/02/17  8:00 AM   Result Value Ref Range    WBC 10.91 3.90 - 12.70 K/uL    RBC 3.35 (L) 4.00 - 5.40 M/uL    Hemoglobin 10.0 (L) 12.0 - 16.0 g/dL    Hematocrit 30.0 (L) 37.0 - 48.5 %    MCV 90 82 - 98 fL    MCH 29.9 27.0 - 31.0 pg    MCHC 33.3 32.0 - 36.0 %    RDW 12.6 11.5 - 14.5 %    Platelets 434 (H) 150 - 350 K/uL    MPV 8.4 (L) 9.2 - 12.9 fL    Gran # 8.7 (H) 1.8 - 7.7 K/uL    Lymph # 0.9 (L) 1.0 - 4.8 K/uL    Mono # 1.2 (H) 0.3 - 1.0 K/uL    Eos # 0.1 0.0 - 0.5 K/uL    Baso # 0.01 0.00 - 0.20 K/uL    Gran% 79.4 (H) 38.0 - 73.0 %    Lymph% 7.9 (L) 18.0 - 48.0 %    Mono% 11.2 4.0 - 15.0 %    Eosinophil% 0.5 0.0 - 8.0 %    Basophil% 0.1 0.0 - 1.9 %    Differential Method Automated    Comprehensive metabolic panel    Collection Time: 03/02/17  8:00 AM   Result Value Ref  Range    Sodium 140 136 - 145 mmol/L    Potassium 4.4 3.5 - 5.1 mmol/L    Chloride 107 95 - 110 mmol/L    CO2 23 23 - 29 mmol/L    Glucose 80 70 - 110 mg/dL    BUN, Bld 9 6 - 20 mg/dL    Creatinine 0.8 0.5 - 1.4 mg/dL    Calcium 9.1 8.7 - 10.5 mg/dL    Total Protein 7.3 6.0 - 8.4 g/dL    Albumin 2.5 (L) 3.5 - 5.2 g/dL    Total Bilirubin 0.4 0.1 - 1.0 mg/dL    Alkaline Phosphatase 91 55 - 135 U/L    AST 30 10 - 40 U/L    ALT 23 10 - 44 U/L    Anion Gap 10 8 - 16 mmol/L    eGFR if African American >60 >60 mL/min/1.73 m^2    eGFR if non African American >60 >60 mL/min/1.73 m^2   Lactate dehydrogenase    Collection Time: 03/02/17  8:00 AM   Result Value Ref Range     (H) 110 - 260 U/L   Uric acid    Collection Time: 03/02/17  8:00 AM   Result Value Ref Range    Uric Acid 5.6 2.4 - 5.7 mg/dL   Protein / creatinine ratio, urine    Collection Time: 03/02/17  8:01 AM   Result Value Ref Range    Protein, Urine Random <7 0 - 15 mg/dL    Creatinine, Random Ur 19.6 15.0 - 325.0 mg/dL    Prot/Creat Ratio, Ur Unable to calculate 0.00 - 0.20   Urinalysis    Collection Time: 03/02/17  8:01 AM   Result Value Ref Range    Specimen UA Urine, Catheterized     Color, UA Yellow Yellow, Straw, E[place]se    Appearance, UA Clear Clear    pH, UA 6.0 5.0 - 8.0    Specific Gravity, UA <=1.005 (A) 1.005 - 1.030    Protein, UA Negative Negative    Glucose, UA Negative Negative    Ketones, UA Negative Negative    Bilirubin (UA) Negative Negative    Occult Blood UA Trace (A) Negative    Nitrite, UA Negative Negative    Urobilinogen, UA Negative <2.0 EU/dL    Leukocytes, UA Trace (A) Negative   Urinalysis Microscopic    Collection Time: 03/02/17  8:01 AM   Result Value Ref Range    RBC, UA 3 0 - 4 /hpf    WBC, UA 1 0 - 5 /hpf    Bacteria, UA Rare None-Occ /hpf    Squam Epithel, UA 4 /hpf    Microscopic Comment SEE COMMENT    Influenza antigen Nasal Swab    Collection Time: 03/02/17  8:23 AM   Result Value Ref Range    Influenza A Ag, EIA Negative  Negative    Influenza B Ag, EIA Negative Negative    Flu A & B Source Nasal Swab    POCT urinalysis, dipstick or tablet reag    Collection Time: 17  8:29 AM   Result Value Ref Range    Color yellow     Spec Grav 1.005     pH, UA 6     WBC, UA trace     Nitrite -     Protein -     Glucose, UA -     Ketones, UA -     Urobilinogen norm     Bilirubin -     Blood, UA trace         Assessment:     36 y.o.  female at PPD7 s/p LTCS, on IV magnesium sulfate.    Active Hospital Problems    Diagnosis  POA    *Preeclampsia, severe [O14.10]  Yes      Resolved Hospital Problems    Diagnosis Date Resolved POA   No resolved problems to display.        Plan:     - Continue magnesium sulfate, no signs of toxicity at this time  - Close maternal monitoring including UOP and BP  BP: (110-187)/(55-98) 113/59   - s/p labetalol 20/40/80, hydralazine 5mg, procardia 30 XL  - UOP: >100 cc/hr  - Recheck in 4 hours or PRN  - Benadryl for HA. Re examine in 30 minutes      Xiang Shaikh MD

## 2017-03-03 NOTE — PROGRESS NOTES
Vss. Copious urine output noted. Denies blurry vision or epigastric pain,also denies sob or CP. pt c/o intermittent HA. Relieved with fioricet tab. BP on low side of WDL.

## 2017-03-05 ENCOUNTER — HOSPITAL ENCOUNTER (OUTPATIENT)
Facility: OTHER | Age: 37
Discharge: HOME | End: 2017-03-08
Attending: OBSTETRICS & GYNECOLOGY | Admitting: OBSTETRICS & GYNECOLOGY
Payer: MEDICAID

## 2017-03-05 DIAGNOSIS — K91.89 ILEUS, POSTOPERATIVE: Primary | ICD-10-CM

## 2017-03-05 DIAGNOSIS — R10.84 GENERALIZED ABDOMINAL PAIN: ICD-10-CM

## 2017-03-05 DIAGNOSIS — K56.7 ILEUS, POSTOPERATIVE: Primary | ICD-10-CM

## 2017-03-05 DIAGNOSIS — R10.9 ABDOMINAL PAIN: ICD-10-CM

## 2017-03-05 PROBLEM — L03.311 CELLULITIS OF ABDOMINAL WALL: Status: ACTIVE | Noted: 2017-03-05

## 2017-03-05 LAB
ALBUMIN SERPL BCP-MCNC: 2.9 G/DL
ALP SERPL-CCNC: 97 U/L
ALT SERPL W/O P-5'-P-CCNC: 24 U/L
ANION GAP SERPL CALC-SCNC: 10 MMOL/L
AST SERPL-CCNC: 31 U/L
BASOPHILS # BLD AUTO: 0.03 K/UL
BASOPHILS NFR BLD: 0.2 %
BILIRUB SERPL-MCNC: 0.3 MG/DL
BILIRUB SERPL-MCNC: NORMAL MG/DL
BLOOD URINE, POC: POSITIVE
BUN SERPL-MCNC: 9 MG/DL
CALCIUM SERPL-MCNC: 9.1 MG/DL
CHLORIDE SERPL-SCNC: 103 MMOL/L
CO2 SERPL-SCNC: 23 MMOL/L
COLOR, POC UA: NORMAL
CREAT SERPL-MCNC: 0.9 MG/DL
DIFFERENTIAL METHOD: ABNORMAL
EOSINOPHIL # BLD AUTO: 0.1 K/UL
EOSINOPHIL NFR BLD: 0.4 %
ERYTHROCYTE [DISTWIDTH] IN BLOOD BY AUTOMATED COUNT: 12.7 %
EST. GFR  (AFRICAN AMERICAN): >60 ML/MIN/1.73 M^2
EST. GFR  (NON AFRICAN AMERICAN): >60 ML/MIN/1.73 M^2
GLUCOSE SERPL-MCNC: 110 MG/DL
GLUCOSE UR QL STRIP: NORMAL
HCT VFR BLD AUTO: 34.4 %
HGB BLD-MCNC: 11.6 G/DL
KETONES UR QL STRIP: POSITIVE
LEUKOCYTE ESTERASE URINE, POC: NORMAL
LYMPHOCYTES # BLD AUTO: 1.8 K/UL
LYMPHOCYTES NFR BLD: 10.8 %
MAGNESIUM SERPL-MCNC: 2 MG/DL
MCH RBC QN AUTO: 30.4 PG
MCHC RBC AUTO-ENTMCNC: 33.7 %
MCV RBC AUTO: 90 FL
MONOCYTES # BLD AUTO: 0.7 K/UL
MONOCYTES NFR BLD: 3.9 %
NEUTROPHILS # BLD AUTO: 14.1 K/UL
NEUTROPHILS NFR BLD: 84.1 %
NITRITE, POC UA: NORMAL
PH, POC UA: NORMAL
PHOSPHATE SERPL-MCNC: 3.9 MG/DL
PLATELET # BLD AUTO: 529 K/UL
PMV BLD AUTO: 8.2 FL
POTASSIUM SERPL-SCNC: 4.4 MMOL/L
PROT SERPL-MCNC: 8 G/DL
PROTEIN, POC: NORMAL
RBC # BLD AUTO: 3.82 M/UL
SODIUM SERPL-SCNC: 136 MMOL/L
SPECIFIC GRAVITY, POC UA: NORMAL
UROBILINOGEN, POC UA: NORMAL
WBC # BLD AUTO: 16.74 K/UL

## 2017-03-05 PROCEDURE — 84100 ASSAY OF PHOSPHORUS: CPT

## 2017-03-05 PROCEDURE — 63600175 PHARM REV CODE 636 W HCPCS: Performed by: STUDENT IN AN ORGANIZED HEALTH CARE EDUCATION/TRAINING PROGRAM

## 2017-03-05 PROCEDURE — 25500020 PHARM REV CODE 255: Performed by: OBSTETRICS & GYNECOLOGY

## 2017-03-05 PROCEDURE — 99223 1ST HOSP IP/OBS HIGH 75: CPT | Mod: ,,, | Performed by: OBSTETRICS & GYNECOLOGY

## 2017-03-05 PROCEDURE — 83735 ASSAY OF MAGNESIUM: CPT

## 2017-03-05 PROCEDURE — 25000003 PHARM REV CODE 250: Performed by: OBSTETRICS & GYNECOLOGY

## 2017-03-05 PROCEDURE — 96372 THER/PROPH/DIAG INJ SC/IM: CPT

## 2017-03-05 PROCEDURE — 99284 EMERGENCY DEPT VISIT MOD MDM: CPT | Mod: 25

## 2017-03-05 PROCEDURE — 85025 COMPLETE CBC W/AUTO DIFF WBC: CPT

## 2017-03-05 PROCEDURE — G0378 HOSPITAL OBSERVATION PER HR: HCPCS

## 2017-03-05 PROCEDURE — 25000003 PHARM REV CODE 250: Performed by: STUDENT IN AN ORGANIZED HEALTH CARE EDUCATION/TRAINING PROGRAM

## 2017-03-05 PROCEDURE — 80053 COMPREHEN METABOLIC PANEL: CPT

## 2017-03-05 PROCEDURE — 81002 URINALYSIS NONAUTO W/O SCOPE: CPT

## 2017-03-05 PROCEDURE — 99283 EMERGENCY DEPT VISIT LOW MDM: CPT | Mod: ,,, | Performed by: OBSTETRICS & GYNECOLOGY

## 2017-03-05 RX ORDER — CLINDAMYCIN HYDROCHLORIDE 150 MG/1
300 CAPSULE ORAL EVERY 8 HOURS
Status: DISCONTINUED | OUTPATIENT
Start: 2017-03-05 | End: 2017-03-05

## 2017-03-05 RX ORDER — NIFEDIPINE 30 MG/1
30 TABLET, EXTENDED RELEASE ORAL DAILY
Status: DISCONTINUED | OUTPATIENT
Start: 2017-03-05 | End: 2017-03-08 | Stop reason: HOSPADM

## 2017-03-05 RX ORDER — BISACODYL 10 MG
10 SUPPOSITORY, RECTAL RECTAL ONCE
Status: COMPLETED | OUTPATIENT
Start: 2017-03-05 | End: 2017-03-05

## 2017-03-05 RX ORDER — ONDANSETRON 4 MG/1
8 TABLET, ORALLY DISINTEGRATING ORAL EVERY 8 HOURS PRN
Status: DISCONTINUED | OUTPATIENT
Start: 2017-03-05 | End: 2017-03-08 | Stop reason: HOSPADM

## 2017-03-05 RX ORDER — HYDROMORPHONE HYDROCHLORIDE 1 MG/ML
0.5 INJECTION, SOLUTION INTRAMUSCULAR; INTRAVENOUS; SUBCUTANEOUS EVERY 4 HOURS PRN
Status: DISCONTINUED | OUTPATIENT
Start: 2017-03-05 | End: 2017-03-06

## 2017-03-05 RX ORDER — SODIUM CHLORIDE 0.9 % (FLUSH) 0.9 %
3 SYRINGE (ML) INJECTION EVERY 8 HOURS
Status: DISCONTINUED | OUTPATIENT
Start: 2017-03-05 | End: 2017-03-08 | Stop reason: HOSPADM

## 2017-03-05 RX ORDER — CLINDAMYCIN PHOSPHATE 600 MG/50ML
600 INJECTION, SOLUTION INTRAVENOUS
Status: DISCONTINUED | OUTPATIENT
Start: 2017-03-05 | End: 2017-03-06

## 2017-03-05 RX ORDER — SODIUM CHLORIDE AND POTASSIUM CHLORIDE 150; 900 MG/100ML; MG/100ML
INJECTION, SOLUTION INTRAVENOUS CONTINUOUS
Status: DISCONTINUED | OUTPATIENT
Start: 2017-03-05 | End: 2017-03-06

## 2017-03-05 RX ORDER — MORPHINE SULFATE 4 MG/ML
4 INJECTION, SOLUTION INTRAMUSCULAR; INTRAVENOUS ONCE
Status: COMPLETED | OUTPATIENT
Start: 2017-03-05 | End: 2017-03-05

## 2017-03-05 RX ORDER — HYDROMORPHONE HYDROCHLORIDE 1 MG/ML
0.5 INJECTION, SOLUTION INTRAMUSCULAR; INTRAVENOUS; SUBCUTANEOUS
Status: DISCONTINUED | OUTPATIENT
Start: 2017-03-05 | End: 2017-03-05

## 2017-03-05 RX ADMIN — CLINDAMYCIN IN 5 PERCENT DEXTROSE 600 MG: 12 INJECTION, SOLUTION INTRAVENOUS at 05:03

## 2017-03-05 RX ADMIN — MORPHINE SULFATE 4 MG: 4 INJECTION, SOLUTION INTRAMUSCULAR; INTRAVENOUS at 01:03

## 2017-03-05 RX ADMIN — NIFEDIPINE 30 MG: 30 TABLET, FILM COATED, EXTENDED RELEASE ORAL at 08:03

## 2017-03-05 RX ADMIN — IOHEXOL 100 ML: 350 INJECTION, SOLUTION INTRAVENOUS at 08:03

## 2017-03-05 RX ADMIN — HYDROMORPHONE HYDROCHLORIDE 0.5 MG: 1 INJECTION, SOLUTION INTRAMUSCULAR; INTRAVENOUS; SUBCUTANEOUS at 01:03

## 2017-03-05 RX ADMIN — CLINDAMYCIN HYDROCHLORIDE 300 MG: 150 CAPSULE ORAL at 08:03

## 2017-03-05 RX ADMIN — SODIUM CHLORIDE AND POTASSIUM CHLORIDE: 9; 1.49 INJECTION, SOLUTION INTRAVENOUS at 02:03

## 2017-03-05 RX ADMIN — HYDROMORPHONE HYDROCHLORIDE 0.5 MG: 1 INJECTION, SOLUTION INTRAMUSCULAR; INTRAVENOUS; SUBCUTANEOUS at 05:03

## 2017-03-05 RX ADMIN — SODIUM CHLORIDE AND POTASSIUM CHLORIDE: 9; 1.49 INJECTION, SOLUTION INTRAVENOUS at 10:03

## 2017-03-05 RX ADMIN — SODIUM CHLORIDE AND POTASSIUM CHLORIDE: 9; 1.49 INJECTION, SOLUTION INTRAVENOUS at 04:03

## 2017-03-05 RX ADMIN — IOHEXOL 25 ML: 350 INJECTION, SOLUTION INTRAVENOUS at 08:03

## 2017-03-05 RX ADMIN — BISACODYL 10 MG: 10 SUPPOSITORY RECTAL at 06:03

## 2017-03-05 RX ADMIN — HYDROMORPHONE HYDROCHLORIDE 0.5 MG: 1 INJECTION, SOLUTION INTRAMUSCULAR; INTRAVENOUS; SUBCUTANEOUS at 06:03

## 2017-03-05 RX ADMIN — HYDROMORPHONE HYDROCHLORIDE 0.5 MG: 1 INJECTION, SOLUTION INTRAMUSCULAR; INTRAVENOUS; SUBCUTANEOUS at 08:03

## 2017-03-05 RX ADMIN — HYDROMORPHONE HYDROCHLORIDE 0.5 MG: 1 INJECTION, SOLUTION INTRAMUSCULAR; INTRAVENOUS; SUBCUTANEOUS at 10:03

## 2017-03-05 NOTE — H&P
"History and Physical                                            Obstetrics          Subjective:      Dao Holt is a 36 y.o.POD#10 s/p 1LTCS 2/2 failure to progress (6cm for 9.5 hours) after PPROM at 35w4d who presents complaining of abdominal pain and back pain. Patient states the pain started about 5-6 PM after eating pizza. She locates the pain to her lower right back and the right side of her abdomen. She is unsure where the pain starts, states it encompasses that entire area. She describes the pain as "stinging", states there are periods when it is increased. She last had percocet this AM. She reports pain when taking a deep breath in. She states states it is difficult to sit still due to the pain. She last had BM 2-3 days ago, she reports passing gas although this has decreased and none recently. She reports nausea, denies, vomiting, fever, or chills. SHE IS MORE DISTENDED THAN SHE WAS AT HER RECENT DISCHARGE, AND PAIN HAS INCREASED  Patient was recently admitted for 24 hours of MgSO4 2/2 PreE with SF. She was also seen 2 days ago for fever and headache. She is currently on clindamycin for wound cellulitis and procardia 30 XL for BP control.      PMHx: History reviewed. No pertinent past medical history.    PSHx: History reviewed. No pertinent surgical history.    All: Review of patient's allergies indicates:  No Known Allergies    Meds:   Prescriptions Prior to Admission   Medication Sig Dispense Refill Last Dose    butalbital-acetaminophen-caffeine -40 mg (FIORICET, ESGIC) -40 mg per tablet Take 1 tablet by mouth every 4 (four) hours as needed for Pain. 20 tablet 1     clindamycin (CLEOCIN) 150 MG capsule Take 2 capsules (300 mg total) by mouth every 8 (eight) hours. 42 capsule 0     ibuprofen (ADVIL,MOTRIN) 600 MG tablet Take 1 tablet (600 mg total) by mouth 3 (three) times daily. 30 tablet 2 3/2/2017    nifedipine 30 MG ORAL TR24 (PROCARDIA-XL) 30 MG (OSM) 24 hr tablet Take 1 tablet " "(30 mg total) by mouth once daily. 30 tablet 5     oxycodone-acetaminophen (PERCOCET) 5-325 mg per tablet Take 1 tablet by mouth every 4 (four) hours as needed. 20 tablet 0     prenatal #108-iron,carbonyl-FA 30-1 mg Tab Take 1 tablet by mouth once daily. 30 tablet 6 Taking       SH:   Social History     Social History    Marital status:      Spouse name: N/A    Number of children: N/A    Years of education: N/A     Occupational History    Not on file.     Social History Main Topics    Smoking status: Former Smoker    Smokeless tobacco: Not on file    Alcohol use No    Drug use: No    Sexual activity: Not Currently     Other Topics Concern    Not on file     Social History Narrative       FH:   Family History   Problem Relation Age of Onset    Breast cancer Mother     Breast cancer Maternal Aunt     Colon cancer Neg Hx     Ovarian cancer Neg Hx        OBHx:   Obstetric History       T0      TAB0   SAB0   E0   M0   L1       # Outcome Date GA Lbr Martinez/2nd Weight Sex Delivery Anes PTL Lv   1  17 35w4d  2.608 kg (5 lb 12 oz) M CS-LTranv EPI Y Y      Name: OWEN SAEZ      Complications: Failure to Progress in Second Stage      Apgar1:  6                Apgar5: 7            Review of Systems:  Constitutional: Negative for fever.   HENT: Negative for sore throat.   Respiratory: Negative for shortness of breath.   Cardiovascular: Negative for chest pain.   Gastrointestinal: Positive for abdominal pain and nausea. Negative for abdominal distention and vomiting.   Genitourinary: Negative for dysuria.   Musculoskeletal: Positive for back pain.   Skin: Negative for rash.   Neurological: Negative for weakness.   Hematological: Does not bruise/bleed easily.     Objective:       /71  Pulse 77  Temp 98.2 °F (36.8 °C)  Resp (!) 22  Ht 5' 5" (1.651 m)  Wt 99.8 kg (220 lb)  LMP 2016  SpO2 99%  Breastfeeding? Yes  BMI 36.61 kg/m2    Temp:  [97.5 °F (36.4 " °C)-99 °F (37.2 °C)] 98.2 °F (36.8 °C)  Pulse:  [] 77  Resp:  [18-22] 22  SpO2:  [97 %-99 %] 99 %  BP: (128-131)/(68-74) 131/71  Constitutional: She appears well-developed and well-nourished. She is not diaphoretic. She appears distressed.   HENT:   Head: Normocephalic and atraumatic.   Eyes: Conjunctivae and EOM are normal.   Neck: Normal range of motion.   Cardiovascular: Normal rate.   Pulmonary/Chest: Breath sounds normal. No respiratory distress. She has no wheezes.   Abdominal: Soft. She exhibits distension. There is tenderness. There is no rebound and no guarding. NO REFERRED PAIN      Hypoactive bowel sounds NORMAL PITCH  Genitourinary:   Genitourinary Comments: No CVA tenderness   Musculoskeletal: Normal range of motion.   Neurological: She is alert and oriented to person, place, and time.   Skin: Skin is warm and dry.   Psychiatric: She has a normal mood and affect.       Recent Labs  Lab 03/02/17  0800 03/05/17  0144   WBC 10.91 16.74*   HGB 10.0* 11.6*   HCT 30.0* 34.4*   MCV 90 90   * 529*       KUB  Possible ileus     Assessment:       35w4d weeks gestation admitted for Postoperative ileus    Active Hospital Problems    Diagnosis  POA    *Postoperative ileus [K91.3]  Yes    Abdominal pain [R10.9]  Yes    Ileus, postoperative [K91.3]  Yes      Resolved Hospital Problems    Diagnosis Date Resolved POA   No resolved problems to display.          Plan:          Postoperative ileus  - Admit to observation  - NPO, start NS with 20 K  - dilaudid 0.5 q3hr PRN  - zofran/phenergan for nausea  - Afebrile, increase in WBC 10.91--> 16.74 w/ left shift  - CT abdomen w/ w/o contrast r/o INTRAABDOMINAL PROCESS CAUSING ILEUS     PreE vs HTN  - continue procardia 30 XL  - BP: (128-130)/(68-74) 130/68     Wound cellulitis  - continue clindamycin 300 mg q8 hrs    Discussed and examined with Dr. James and staff Dr. Roxanne Oliveira MD    I have personally taken the history and have examined this  patient IN THE OB ED, and I agree with the resident's note as stated above.

## 2017-03-05 NOTE — PROGRESS NOTES
Dr malloy stated pt is to remain NPO,maintain ivf, may place doug hose and encourage ambulation in halls. Will decrease dilaudid to q4hr.

## 2017-03-05 NOTE — IP AVS SNAPSHOT
[facility] [place] [facility] ([facility])  [address] [place], [place] [address]  Phone: [phone]           Patient Discharge Instructions     Our goal is to set you up for success. This packet includes information on your condition, medications, and your home care. It will help you to care for yourself so you don't get sicker and need to go back to the hospital.     Please ask your nurse if you have any questions.        There are many details to remember when preparing to leave the hospital. Here is what you will need to do:    1. Take your medicine. If you are prescribed medications, review your Medication List in the following pages. You may have new medications to  at the pharmacy and others that you'll need to stop taking. Review the instructions for how and when to take your medications. Talk with your doctor or nurses if you are unsure of what to do.     2. Go to your follow-up appointments. Specific follow-up information is listed in the following pages. Your may be contacted by a transition nurse or clinical provider about future appointments. Be sure we have all of the phone numbers to reach you, if needed. Please contact your provider's office if you are unable to make an appointment.     3. Watch for warning signs. Your doctor or nurse will give you detailed warning signs to watch for and when to call for assistance. These instructions may also include educational information about your condition. If you experience any of warning signs to your health, call your doctor.               [facility]  Unless otherwise directed by your provider, please contact [facility], our nurse care line that is available for 24/7 assistance.     [phone] (toll-free)    Registered nurses in the [facility] provide clinical advisement, health education, appointment booking, and other advisory services.                    ** Verify the list of medication(s) below is accurate and up to  date. Carry this with you in case of emergency. If your medications have changed, please notify your healthcare provider.             Medication List      START taking these medications        Additional Info                      hydrALAZINE 25 MG tablet   Commonly known as:  APRESOLINE   Quantity:  90 tablet   Refills:  11   Dose:  25 mg    Instructions:  Take 1 tablet (25 mg total) by mouth 3 (three) times daily.     Begin Date    AM    Noon    PM    Bedtime       nitrofurantoin (macrocrystal-monohydrate) 100 MG capsule   Commonly known as:  MACROBID   Quantity:  26 capsule   Refills:  0   Dose:  100 mg    Instructions:  Take 1 capsule (100 mg total) by mouth 2 (two) times daily.     Begin Date    AM    Noon    PM    Bedtime         CONTINUE taking these medications        Additional Info                      butalbital-acetaminophen-caffeine -40 mg -40 mg per tablet   Commonly known as:  FIORICET, ESGIC   Quantity:  20 tablet   Refills:  1   Dose:  1 tablet    Instructions:  Take 1 tablet by mouth every 4 (four) hours as needed for Pain.     Begin Date    AM    Noon    PM    Bedtime       clindamycin 150 MG capsule   Commonly known as:  CLEOCIN   Quantity:  42 capsule   Refills:  0   Dose:  300 mg    Last time this was given:  300 mg on 3/8/2017  2:00 PM   Instructions:  Take 2 capsules (300 mg total) by mouth every 8 (eight) hours.     Begin Date    AM    Noon    PM    Bedtime       ibuprofen 600 MG tablet   Commonly known as:  ADVIL,MOTRIN   Quantity:  30 tablet   Refills:  2   Dose:  600 mg    Instructions:  Take 1 tablet (600 mg total) by mouth 3 (three) times daily.     Begin Date    AM    Noon    PM    Bedtime       oxycodone-acetaminophen 5-325 mg per tablet   Commonly known as:  PERCOCET   Quantity:  20 tablet   Refills:  0   Dose:  1 tablet    Last time this was given:  1 tablet on 3/8/2017  1:11 PM   Instructions:  Take 1 tablet by mouth every 4 (four) hours as needed.     Begin Date    AM     Noon    PM    Bedtime       prenatal #108-iron,carbonyl-FA 30-1 mg Tab   Quantity:  30 tablet   Refills:  6   Dose:  1 tablet    Instructions:  Take 1 tablet by mouth once daily.     Begin Date    AM    Noon    PM    Bedtime         STOP taking these medications     nifedipine 30 MG ORAL TR24 30 MG (OSM) 24 hr tablet   Commonly known as:  PROCARDIA-XL            Where to Get Your Medications      These [facility] [place] [facility] [address]  [address] [facility] [place] [address]     Phone:  [phone]     hydrALAZINE 25 MG tablet    nitrofurantoin (macrocrystal-monohydrate) 100 MG capsule                  Please bring to all follow up appointments:    1. A copy of your discharge instructions.  2. All medicines you are currently taking in their original bottles.  3. Identification and insurance card.    Please arrive 15 minutes ahead of scheduled appointment time.    Please call 24 hours in advance if you must reschedule your appointment and/or time.        Your Scheduled Appointments     Mar 14, 2017  9:15 AM CDT   Post Partum with Sekou Sims MD   [facility]    [address] [address] [phone]              Follow-up Information     Follow up with Sekou Sims MD In 2 days.    Specialties:  Obstetrics, Obstetrics and Gynecology    Why:  Blood Pressure check    Contact information:    [address] [place] [address] [phone]          Discharge Instructions     Future Orders    Activity as tolerated     Call MD for:  difficulty breathing or increased cough     Call MD for:  persistent dizziness, light-headedness, or visual disturbances     Call MD for:  persistent nausea and vomiting or diarrhea     Call MD for:  redness, tenderness, or signs of infection (pain, swelling, redness, odor or green/yellow discharge around incision site)     Call MD for:  severe  "persistent headache     Call MD for:  severe uncontrolled pain     Call MD for:  temperature >100.4     Diet general     Questions:    Total calories:      Fat restriction, if any:      Protein restriction, if any:      Na restriction, if any:      Fluid restriction:      Additional restrictions:          Primary Diagnosis     Your primary diagnosis was:  Postoperative Intestinal Paralysis      Admission Information     Date & Time Provider Department CSN    3/5/2017 12:54 AM Sekou Sims MD [facility] [id number]      Care Providers     Provider Role Specialty Primary office phone    Sekou Sims MD Attending Provider Obstetrics [phone]      Your Vitals Were     BP Pulse Temp Resp Height Weight    138/82 84 98.4 °F (36.9 °C) (Oral) 18 5' 5" (1.651 m) 99.8 kg (220 lb)    Last Period SpO2 BMI          06/19/2016 97% 36.61 kg/m2        Recent Lab Values     No lab values to display.      Pending Labs     Order Current Status    Urine culture In process    Aerobic culture Preliminary result    Culture, Anaerobe Preliminary result      Allergies as of 3/8/2017     No Known Allergies      Advance Directives     An advance directive is a document which, in the event you are no longer able to make decisions for yourself, tells your healthcare team what kind of treatment you do or do not want to receive, or who you would like to make those decisions for you.  If you do not currently have an advance directive, [facility] encourages you to create one.  For more information call:  [phone] ([phone]), [phone] ([phone]),  or log on to [email or web address].        Smoking Cessation     If you would like to quit smoking:   You may be eligible for free services if you are a [place] resident and started smoking cigarettes before September 1, 1988.  Call the Smoking Cessation [employer] (SCT) toll free at (098) 330-4675 or (501) 258-7128.   Call 6-800-QUIT-NOW if you do not meet " the above criteria.            Language Assistance Services     ATTENTION: Language assistance services are available, free of charge. Please call [phone].      ATENCIÓN: Si habla coby, tiene a kent disposición servicios gratuitos de asistencia lingüística. Llame al [phone].     CHÚ Ý: N?u b?n nói Ti?ng Vi?t, có các d?ch v? h? tr? ngôn ng? mi?n phí dành cho b?n. G?i s? [phone].         [facility] complies with applicable Federal civil rights laws and does not discriminate on the basis of race, color, national origin, age, disability, or sex.

## 2017-03-05 NOTE — PROGRESS NOTES
Notified dr jaimes that pt is in pain 10/10, resp rate 40, pt states that the abdominal pain hurts w/breathing, lungs clear with fine crackles to bases, encourated pt to take slow deep breaths, md stated ok to hold ivf while pt goes for CT, transported via w/c by charge nurse.

## 2017-03-05 NOTE — ED PROVIDER NOTES
"Encounter Date: 3/5/2017       History     Chief Complaint   Patient presents with    Back Pain    Abdominal Pain     Review of patient's allergies indicates:  No Known Allergies  HPI Comments: Dao Holt is a 36 y.o.POD#10 s/p 1LTCS 2/2 failure to progress (6cm for 9.5 hours) after PPROM at 35w4d who presents complaining of abdominal pain and back pain. Patient states the pain started about 5-6 PM after eating pizza.  She locates the pain to her lower right back and the right side of her abdomen. She is unsure where the pain starts, states it encompasses that entire area.  She describes the pain as "stinging", states there are periods when it is increased. She last had percocet this AM. She reports pain when taking a deep breath in. She states states it is difficult to sit still due to the pain. She last had BM 2-3 days ago, she reports passing gas although this has decreased.  She reports nausea, denies, vomiting, fever, or chills.    Patient was recently admitted for 24 hours of MgSO4 2/2 PreE with SF.  She was also seen 2 days ago for fever and headache. She is currently on clindamycin for wound cellulitis and procardia 30 XL for BP control.     The history is provided by the patient.     History reviewed. No pertinent past medical history.  History reviewed. No pertinent surgical history.  Family History   Problem Relation Age of Onset    Breast cancer Mother     Breast cancer Maternal Aunt     Colon cancer Neg Hx     Ovarian cancer Neg Hx      Social History   Substance Use Topics    Smoking status: Former Smoker    Smokeless tobacco: None    Alcohol use No     Review of Systems   Constitutional: Negative for fever.   HENT: Negative for sore throat.    Respiratory: Negative for shortness of breath.    Cardiovascular: Negative for chest pain.   Gastrointestinal: Positive for abdominal pain and nausea. Negative for abdominal distention and vomiting.   Genitourinary: Negative for dysuria. "   Musculoskeletal: Positive for back pain.   Skin: Negative for rash.   Neurological: Negative for weakness.   Hematological: Does not bruise/bleed easily.       Physical Exam   Initial Vitals   BP Pulse Resp Temp SpO2   03/05/17 0104 03/05/17 0058 03/05/17 0104 03/05/17 0058 03/05/17 0058   128/74 94 18 99 °F (37.2 °C) 98 %     Physical Exam    Nursing note and vitals reviewed.  Constitutional: She appears well-developed and well-nourished. She is not diaphoretic. She appears distressed.   HENT:   Head: Normocephalic and atraumatic.   Eyes: Conjunctivae and EOM are normal.   Neck: Normal range of motion.   Cardiovascular: Normal rate.   Pulmonary/Chest: Breath sounds normal. No respiratory distress. She has no wheezes.   Abdominal: Soft. She exhibits distension. There is tenderness. There is no rebound and no guarding.       Hyperactive bowel sounds   Genitourinary:   Genitourinary Comments: No CVA tenderness   Musculoskeletal: Normal range of motion.   Neurological: She is alert and oriented to person, place, and time.   Skin: Skin is warm and dry.   Psychiatric: She has a normal mood and affect.         ED Course   Procedures  Labs Reviewed   CBC W/ AUTO DIFFERENTIAL - Abnormal; Notable for the following:        Result Value    WBC 16.74 (*)     RBC 3.82 (*)     Hemoglobin 11.6 (*)     Hematocrit 34.4 (*)     Platelets 529 (*)     MPV 8.2 (*)     Gran # 14.1 (*)     Gran% 84.1 (*)     Lymph% 10.8 (*)     Mono% 3.9 (*)     All other components within normal limits   COMPREHENSIVE METABOLIC PANEL - Abnormal; Notable for the following:     Albumin 2.9 (*)     All other components within normal limits   MAGNESIUM   PHOSPHORUS   POCT URINALYSIS, DIPSTICK OR TABLET REAGENT, AUTOMATED, WITH MICROSCOP               Medical Decision Making:   Differential Diagnosis:   Constipation, ileus, bowel obstruction, infection  ED Management:  KUB flat and erect- with possible ileus  CBC 16/11/34/529, increase in white count  CMP  WNL    Admit for management of ileus  Other:   I have discussed this case with another health care provider.              Attending Attestation:   Physician Attestation Statement for Resident:  As the supervising MD   Physician Attestation Statement: I have personally seen and examined this patient.   I agree with the above history. -:   As the supervising MD I agree with the above PE.   -: Ms. Holt appears to be in distress.  She is standing and leaning on the side of the bed and breathing deeply.  When asked to lie down, she complies but with moaning and sobbing.  Her entire abdomen appears distended and is tender to palpation with guarding.     As the supervising MD I agree with the above treatment, course, plan, and disposition.   -: Will admit for likely ileus, further work-up as inpatient  I have reviewed and agree with the residents interpretation of the following: lab data and x-rays.  I have reviewed the following: old records at this facility.                    ED Course     Clinical Impression:   The primary encounter diagnosis was Ileus, postoperative. Diagnoses of Abdominal pain and Generalized abdominal pain were also pertinent to this visit.    Disposition:   Disposition: Admitted       Irish العراقي MD  Resident  03/05/17 2146       Clara Liriano,   03/08/17 0892

## 2017-03-06 ENCOUNTER — TELEPHONE (OUTPATIENT)
Dept: OBSTETRICS AND GYNECOLOGY | Facility: CLINIC | Age: 37
End: 2017-03-06

## 2017-03-06 LAB
ANION GAP SERPL CALC-SCNC: 11 MMOL/L
BASOPHILS # BLD AUTO: 0.04 K/UL
BASOPHILS NFR BLD: 0.3 %
BUN SERPL-MCNC: 7 MG/DL
CALCIUM SERPL-MCNC: 9.5 MG/DL
CHLORIDE SERPL-SCNC: 108 MMOL/L
CO2 SERPL-SCNC: 20 MMOL/L
CREAT SERPL-MCNC: 0.8 MG/DL
DIFFERENTIAL METHOD: ABNORMAL
EOSINOPHIL # BLD AUTO: 0.1 K/UL
EOSINOPHIL NFR BLD: 0.7 %
ERYTHROCYTE [DISTWIDTH] IN BLOOD BY AUTOMATED COUNT: 12.8 %
EST. GFR  (AFRICAN AMERICAN): >60 ML/MIN/1.73 M^2
EST. GFR  (NON AFRICAN AMERICAN): >60 ML/MIN/1.73 M^2
GLUCOSE SERPL-MCNC: 67 MG/DL
HCT VFR BLD AUTO: 34.1 %
HGB BLD-MCNC: 11.1 G/DL
LYMPHOCYTES # BLD AUTO: 1.7 K/UL
LYMPHOCYTES NFR BLD: 13.2 %
MAGNESIUM SERPL-MCNC: 1.8 MG/DL
MCH RBC QN AUTO: 29.9 PG
MCHC RBC AUTO-ENTMCNC: 32.6 %
MCV RBC AUTO: 92 FL
MONOCYTES # BLD AUTO: 0.6 K/UL
MONOCYTES NFR BLD: 4.5 %
NEUTROPHILS # BLD AUTO: 10.6 K/UL
NEUTROPHILS NFR BLD: 80.8 %
PHOSPHATE SERPL-MCNC: 3.9 MG/DL
PLATELET # BLD AUTO: 533 K/UL
PMV BLD AUTO: 8.1 FL
POTASSIUM SERPL-SCNC: 5.1 MMOL/L
RBC # BLD AUTO: 3.71 M/UL
SODIUM SERPL-SCNC: 139 MMOL/L
WBC # BLD AUTO: 13.08 K/UL

## 2017-03-06 PROCEDURE — 80048 BASIC METABOLIC PNL TOTAL CA: CPT

## 2017-03-06 PROCEDURE — 84100 ASSAY OF PHOSPHORUS: CPT

## 2017-03-06 PROCEDURE — 36415 COLL VENOUS BLD VENIPUNCTURE: CPT

## 2017-03-06 PROCEDURE — 25000003 PHARM REV CODE 250: Performed by: OBSTETRICS & GYNECOLOGY

## 2017-03-06 PROCEDURE — G0378 HOSPITAL OBSERVATION PER HR: HCPCS

## 2017-03-06 PROCEDURE — 85025 COMPLETE CBC W/AUTO DIFF WBC: CPT

## 2017-03-06 PROCEDURE — 83735 ASSAY OF MAGNESIUM: CPT

## 2017-03-06 PROCEDURE — 25000003 PHARM REV CODE 250: Performed by: STUDENT IN AN ORGANIZED HEALTH CARE EDUCATION/TRAINING PROGRAM

## 2017-03-06 RX ORDER — CLINDAMYCIN HYDROCHLORIDE 150 MG/1
300 CAPSULE ORAL EVERY 8 HOURS
Status: DISCONTINUED | OUTPATIENT
Start: 2017-03-06 | End: 2017-03-08 | Stop reason: HOSPADM

## 2017-03-06 RX ORDER — OXYCODONE AND ACETAMINOPHEN 10; 325 MG/1; MG/1
1 TABLET ORAL ONCE
Status: COMPLETED | OUTPATIENT
Start: 2017-03-07 | End: 2017-03-07

## 2017-03-06 RX ORDER — CLINDAMYCIN HYDROCHLORIDE 150 MG/1
300 CAPSULE ORAL EVERY 8 HOURS
Status: DISCONTINUED | OUTPATIENT
Start: 2017-03-06 | End: 2017-03-06

## 2017-03-06 RX ORDER — OXYCODONE AND ACETAMINOPHEN 5; 325 MG/1; MG/1
1 TABLET ORAL EVERY 4 HOURS PRN
Status: DISCONTINUED | OUTPATIENT
Start: 2017-03-06 | End: 2017-03-08 | Stop reason: HOSPADM

## 2017-03-06 RX ADMIN — CLINDAMYCIN HYDROCHLORIDE 300 MG: 150 CAPSULE ORAL at 04:03

## 2017-03-06 RX ADMIN — NIFEDIPINE 30 MG: 30 TABLET, FILM COATED, EXTENDED RELEASE ORAL at 08:03

## 2017-03-06 RX ADMIN — SODIUM CHLORIDE AND POTASSIUM CHLORIDE: 9; 1.49 INJECTION, SOLUTION INTRAVENOUS at 07:03

## 2017-03-06 RX ADMIN — CLINDAMYCIN HYDROCHLORIDE 300 MG: 150 CAPSULE ORAL at 09:03

## 2017-03-06 RX ADMIN — OXYCODONE HYDROCHLORIDE AND ACETAMINOPHEN 1 TABLET: 5; 325 TABLET ORAL at 08:03

## 2017-03-06 RX ADMIN — HYDROMORPHONE HYDROCHLORIDE 0.5 MG: 1 INJECTION, SOLUTION INTRAMUSCULAR; INTRAVENOUS; SUBCUTANEOUS at 02:03

## 2017-03-06 RX ADMIN — OXYCODONE HYDROCHLORIDE AND ACETAMINOPHEN 1 TABLET: 5; 325 TABLET ORAL at 09:03

## 2017-03-06 RX ADMIN — CLINDAMYCIN IN 5 PERCENT DEXTROSE 600 MG: 12 INJECTION, SOLUTION INTRAVENOUS at 12:03

## 2017-03-06 NOTE — TELEPHONE ENCOUNTER
----- Message from Marixa Mercado sent at 3/6/2017 12:29 PM CST -----  Contact: PAUL SAEZ [[id number]]  x_  1st Request  _  2nd Request  _  3rd Request        Who: PAUL SAEZ [[id number]]    Why: patient states she would like a call back in regards to her appt and her blood pressure medication     What Number to Call Back: [phone]    When to Expect a call back: (Before the end of the day)   -- if call after 3:00 call back will be tomorrow.

## 2017-03-06 NOTE — PLAN OF CARE
Problem: Patient Care Overview  Goal: Plan of Care Review  Outcome: Ongoing (interventions implemented as appropriate)  POC reviewed with patient, verbalized understanding. Ambulating/voiding. Patient states that pain is limited when ambulating and sitting up in bed and that pain is worse when laying down. Patient encouraged to ambulate when awake. Patient requesting pain medication Q4. Will continue to monitor.

## 2017-03-06 NOTE — PROGRESS NOTES
PROGRESS NOTE  ANTEPARTUM    Admit Date: 3/5/2017   LOS: 0 days     Reason for Admission:  Postoperative ileus    SUBJECTIVE:     Dao Holt is a 36 y.o. female, POD#11 s/p 1LTCS 2/2 failure to progress (6cm for 9.5 hours) after PPROM at 35w4d who is admitted for possible postop ileus (complaining of RLQ abdominal pain and back pain).   This morning, patient reports continued 10/10 aching RLQ abdominal/back pain only with lying down, occasionally sharp. Reports no pain with ambulation. Reports +flatus, +small BM last night after Dulcolax suppository. Desires a diet. Reports intermittent nausea yesterday (none this morning). Denies emesis. Denies F/C.    Scheduled Meds:   clindamycin (CLEOCIN) IVPB  600 mg Intravenous Q8H    nifedipine 30 MG ORAL TR24  30 mg Oral Daily    sodium chloride 0.9%  3 mL Intravenous Q8H     Continuous Infusions:   0/9% NACL & POTASSIUM CHLORIDE 20 MEQ/L 125 mL/hr at 03/05/17 2240     PRN Meds:HYDROmorphone, ondansetron, promethazine (PHENERGAN) IVPB    Review of patient's allergies indicates:  No Known Allergies    OBJECTIVE:     Vital Signs (Most Recent)  Temp: 97.8 °F (36.6 °C) (03/06/17 0448)  Pulse: 76 (03/06/17 0448)  Resp: 20 (03/06/17 0448)  BP: 138/69 (03/06/17 0448)  SpO2: 100 % (03/06/17 0448)    Temperature Range Min/Max (Last 24H):  Temp:  [96.6 °F (35.9 °C)-98.4 °F (36.9 °C)]     Vital Signs Range (Last 24H):  Temp:  [96.6 °F (35.9 °C)-98.4 °F (36.9 °C)]   Pulse:  [70-83]   Resp:  [20-40]   BP: (133-143)/(69-78)   SpO2:  [97 %-100 %]     I & O (Last 24H):  Intake/Output Summary (Last 24 hours) at 03/06/17 0626  Last data filed at 03/05/17 1610   Gross per 24 hour   Intake                0 ml   Output             1980 ml   Net            -1980 ml       Physical Exam:  General: well developed, well nourished  Lungs:  normal respiratory effort  Heart: regular rate  Abdomen: tender to deep palpation in the RLQ, soft, non-distended; bowel sounds hypoactive; no masses,  no  organomegaly  Wound/Incision: clean, dry, intact, no erythema, induration or drainage  Extremities: no cyanosis or edema, or clubbing    No CVA tenderness, no tenderness over the back    Laboratory:  CBC:   Recent Labs  Lab 17  014   WBC 16.74*   RBC 3.82*   HGB 11.6*   HCT 34.4*   *   MCV 90   MCH 30.4   MCHC 33.7     CMP:   Recent Labs  Lab 17      CALCIUM 9.1   ALBUMIN 2.9*   PROT 8.0      K 4.4   CO2 23      BUN 9   CREATININE 0.9   ALKPHOS 97   ALT 24   AST 31   BILITOT 0.3   Mg 2.0  Phos 3.9    CT A/P 2017:  Narrative   CT abdomen and pelvis with  IV and oral contrast.    Comparison: The.    Results: 5 mm axial images of the abdomen and pelvis were obtained after the administration of 100 cc of omni-350 IV contrast and oral contrast.    Coronal and sagittal reformat images were generated.    Bilateral small pleural effusion.  The liver, gallbladder, pancreas, spleen, adrenal glands appear normal.  There is ill-defined focal areas of heterogeneous enhancement within the bilateral kidneys, this can be seen with areas of pyelonephritis, to correlate with urinalysis.  The aorta tapers normally,   no para-aortic lymphadenopathy.  There is a small amount of retained stool, mild air distention of the small bowel.  The uterus remain slightly prominent which appears within normal limits after recent .  Just adjacent to the uterus in the right hemipelvis is a small area of air fluid collection measuring approximately 4.0 x 1.8 x 2.8 cm, it could represent a small seroma, hematoma, early forming abscess, a nonopacified loops of bowel is consider less likely.  The appendix is only partially visualized, the portion seen appears normal.  A followup could be done to insure resolution.  The osseous structures appear within normal limits.   Impression     1. Abnormal attenuation of the bilateral kidneys with areas of decreased enhancement concerning for bilateral  pyelonephritis.  Followup retroperitoneal ultrasound or CT recommended to ensure resolution.  2. Small air-fluid collection right to the uterus could represent a postop seroma, hematoma or early forming abscess, followup recommended to ensure resolution.  2. Recent .         ASSESSMENT/PLAN:     Active Hospital Problems    Diagnosis  POA    *Postoperative ileus [K91.3]  Yes    Abdominal pain [R10.9]  Yes    Ileus, postoperative [K91.3]  Yes      Resolved Hospital Problems    Diagnosis Date Resolved POA   No resolved problems to display.       Assessment:    POD#11 s/p 1LTCS 2/2 failure to progress (6cm for 9.5 hours) after PPROM at 35w4d who is admitted for possible postop ileus (complaining of RLQ abdominal pain and back pain).     Plan:  1. Abdominal pain, back pain, c/f possible postop ileus   - NPO, NS w 20 K  - Afebrile, HR nl, increase in WBC 10.91--> 16.74 w/ left shift  - AXR: ileus  - CT A/P: 4 x 1.8 x 2.8cm seroma v. Hematoma v. Early abscess (as above)  - F/u AM CBC, BMP, Mg, Phos   - Dilaudid 0.5mg q4h PRN  - Zofran/Phenergan PRN nausea    2. Wound cellulitis  - s/p Bactrim as an outpatient  - IV Clindamycin 600mg q8h while inpatient    3. PreE with SF  - BP: (133-143)/(69-78) 138/69  - Continue Procardia 30 XL    Heidy Arenas MD  PGY-2 OB/GYN  [phone]

## 2017-03-07 LAB
ANION GAP SERPL CALC-SCNC: 9 MMOL/L
BACTERIA #/AREA URNS HPF: ABNORMAL /HPF
BASOPHILS # BLD AUTO: 0.03 K/UL
BASOPHILS NFR BLD: 0.2 %
BILIRUB UR QL STRIP: NEGATIVE
BUN SERPL-MCNC: 7 MG/DL
CALCIUM SERPL-MCNC: 9.5 MG/DL
CHLORIDE SERPL-SCNC: 104 MMOL/L
CLARITY UR: ABNORMAL
CO2 SERPL-SCNC: 23 MMOL/L
COLOR UR: YELLOW
CREAT SERPL-MCNC: 0.8 MG/DL
DIFFERENTIAL METHOD: ABNORMAL
EOSINOPHIL # BLD AUTO: 0.1 K/UL
EOSINOPHIL NFR BLD: 0.9 %
ERYTHROCYTE [DISTWIDTH] IN BLOOD BY AUTOMATED COUNT: 12.5 %
EST. GFR  (AFRICAN AMERICAN): >60 ML/MIN/1.73 M^2
EST. GFR  (NON AFRICAN AMERICAN): >60 ML/MIN/1.73 M^2
GLUCOSE SERPL-MCNC: 79 MG/DL
GLUCOSE UR QL STRIP: NEGATIVE
HCT VFR BLD AUTO: 32.6 %
HGB BLD-MCNC: 10.8 G/DL
HGB UR QL STRIP: ABNORMAL
KETONES UR QL STRIP: NEGATIVE
LEUKOCYTE ESTERASE UR QL STRIP: ABNORMAL
LYMPHOCYTES # BLD AUTO: 1.2 K/UL
LYMPHOCYTES NFR BLD: 9.7 %
MAGNESIUM SERPL-MCNC: 1.7 MG/DL
MCH RBC QN AUTO: 29.8 PG
MCHC RBC AUTO-ENTMCNC: 33.1 %
MCV RBC AUTO: 90 FL
MICROSCOPIC COMMENT: ABNORMAL
MONOCYTES # BLD AUTO: 1 K/UL
MONOCYTES NFR BLD: 7.5 %
NEUTROPHILS # BLD AUTO: 10.3 K/UL
NEUTROPHILS NFR BLD: 81.4 %
NITRITE UR QL STRIP: NEGATIVE
PH UR STRIP: 6 [PH] (ref 5–8)
PHOSPHATE SERPL-MCNC: 4.1 MG/DL
PLATELET # BLD AUTO: 516 K/UL
PMV BLD AUTO: 8 FL
POTASSIUM SERPL-SCNC: 4.5 MMOL/L
PROT UR QL STRIP: NEGATIVE
RBC # BLD AUTO: 3.63 M/UL
RBC #/AREA URNS HPF: 12 /HPF (ref 0–4)
SODIUM SERPL-SCNC: 136 MMOL/L
SP GR UR STRIP: 1.01 (ref 1–1.03)
SQUAMOUS #/AREA URNS HPF: 2 /HPF
URN SPEC COLLECT METH UR: ABNORMAL
UROBILINOGEN UR STRIP-ACNC: NEGATIVE EU/DL
WBC # BLD AUTO: 12.68 K/UL
WBC #/AREA URNS HPF: 50 /HPF (ref 0–5)
WBC CLUMPS URNS QL MICRO: ABNORMAL

## 2017-03-07 PROCEDURE — G0378 HOSPITAL OBSERVATION PER HR: HCPCS

## 2017-03-07 PROCEDURE — 83735 ASSAY OF MAGNESIUM: CPT

## 2017-03-07 PROCEDURE — 84100 ASSAY OF PHOSPHORUS: CPT

## 2017-03-07 PROCEDURE — 87070 CULTURE OTHR SPECIMN AEROBIC: CPT

## 2017-03-07 PROCEDURE — 85025 COMPLETE CBC W/AUTO DIFF WBC: CPT

## 2017-03-07 PROCEDURE — 25000003 PHARM REV CODE 250: Performed by: OBSTETRICS & GYNECOLOGY

## 2017-03-07 PROCEDURE — 25000003 PHARM REV CODE 250: Performed by: STUDENT IN AN ORGANIZED HEALTH CARE EDUCATION/TRAINING PROGRAM

## 2017-03-07 PROCEDURE — 87075 CULTR BACTERIA EXCEPT BLOOD: CPT

## 2017-03-07 PROCEDURE — 87186 SC STD MICRODIL/AGAR DIL: CPT

## 2017-03-07 PROCEDURE — 87086 URINE CULTURE/COLONY COUNT: CPT

## 2017-03-07 PROCEDURE — 36415 COLL VENOUS BLD VENIPUNCTURE: CPT

## 2017-03-07 PROCEDURE — 81000 URINALYSIS NONAUTO W/SCOPE: CPT

## 2017-03-07 PROCEDURE — 99225 PR SUBSEQUENT OBSERVATION CARE,LEVEL II: CPT | Mod: ,,, | Performed by: OBSTETRICS & GYNECOLOGY

## 2017-03-07 PROCEDURE — 25500020 PHARM REV CODE 255: Performed by: OBSTETRICS & GYNECOLOGY

## 2017-03-07 PROCEDURE — 87077 CULTURE AEROBIC IDENTIFY: CPT

## 2017-03-07 PROCEDURE — 63600175 PHARM REV CODE 636 W HCPCS: Performed by: STUDENT IN AN ORGANIZED HEALTH CARE EDUCATION/TRAINING PROGRAM

## 2017-03-07 PROCEDURE — 80048 BASIC METABOLIC PNL TOTAL CA: CPT

## 2017-03-07 RX ORDER — LANOLIN ALCOHOL/MO/W.PET/CERES
400 CREAM (GRAM) TOPICAL EVERY 4 HOURS
Status: COMPLETED | OUTPATIENT
Start: 2017-03-07 | End: 2017-03-07

## 2017-03-07 RX ADMIN — CLINDAMYCIN HYDROCHLORIDE 300 MG: 150 CAPSULE ORAL at 12:03

## 2017-03-07 RX ADMIN — CLINDAMYCIN HYDROCHLORIDE 300 MG: 150 CAPSULE ORAL at 03:03

## 2017-03-07 RX ADMIN — SODIUM CHLORIDE, PRESERVATIVE FREE 3 ML: 5 INJECTION INTRAVENOUS at 10:03

## 2017-03-07 RX ADMIN — NIFEDIPINE 30 MG: 30 TABLET, FILM COATED, EXTENDED RELEASE ORAL at 09:03

## 2017-03-07 RX ADMIN — CEFTRIAXONE 1 G: 1 INJECTION, SOLUTION INTRAVENOUS at 03:03

## 2017-03-07 RX ADMIN — CLINDAMYCIN HYDROCHLORIDE 300 MG: 150 CAPSULE ORAL at 10:03

## 2017-03-07 RX ADMIN — OXYCODONE HYDROCHLORIDE AND ACETAMINOPHEN 1 TABLET: 5; 325 TABLET ORAL at 03:03

## 2017-03-07 RX ADMIN — CLINDAMYCIN HYDROCHLORIDE 300 MG: 150 CAPSULE ORAL at 11:03

## 2017-03-07 RX ADMIN — Medication 400 MG: at 03:03

## 2017-03-07 RX ADMIN — Medication 400 MG: at 09:03

## 2017-03-07 RX ADMIN — OXYCODONE HYDROCHLORIDE AND ACETAMINOPHEN 1 TABLET: 10; 325 TABLET ORAL at 12:03

## 2017-03-07 RX ADMIN — OXYCODONE HYDROCHLORIDE AND ACETAMINOPHEN 1 TABLET: 5; 325 TABLET ORAL at 06:03

## 2017-03-07 RX ADMIN — IOHEXOL 100 ML: 350 INJECTION, SOLUTION INTRAVENOUS at 01:03

## 2017-03-07 NOTE — PROGRESS NOTES
PROGRESS NOTE  ANTEPARTUM    Admit Date: 3/5/2017   LOS: 0 days     Reason for Admission:  Postoperative ileus    SUBJECTIVE:     Dao Holt is a 36 y.o. female, POD#12 s/p 1LTCS 2/2 failure to progress (6cm for 9.5 hours) after PPROM at 35w4d who is admitted for possible postop ileus (complaining of RLQ abdominal pain and back pain).    This morning, patient reports continued 10/10 aching RLQ abdominal/back pain only with lying down, occasionally sharp. Reports no pain with ambulation. Reports +flatus, ++ BM yesterday. Tolerating a regular diet with no N/V. Denies F/C.      Scheduled Meds:   clindamycin  300 mg Oral Q8H    nifedipine 30 MG ORAL TR24  30 mg Oral Daily    sodium chloride 0.9%  3 mL Intravenous Q8H     Continuous Infusions:     PRN Meds:ondansetron, oxycodone-acetaminophen, promethazine (PHENERGAN) IVPB    Review of patient's allergies indicates:  No Known Allergies    OBJECTIVE:     Vital Signs (Most Recent)  Temp: 97.8 °F (36.6 °C) (03/07/17 0413)  Pulse: 84 (03/07/17 0413)  Resp: 18 (03/07/17 0413)  BP: (!) 142/70 (03/07/17 0413)  SpO2: 98 % (03/07/17 0413)    Temperature Range Min/Max (Last 24H):  Temp:  [97.5 °F (36.4 °C)-98.1 °F (36.7 °C)]     Vital Signs Range (Last 24H):  Temp:  [97.5 °F (36.4 °C)-98.1 °F (36.7 °C)]   Pulse:  [71-89]   Resp:  [18]   BP: (130-142)/(70-82)   SpO2:  [97 %-100 %]     I & O (Last 24H):    Intake/Output Summary (Last 24 hours) at 03/07/17 0600  Last data filed at 03/06/17 1600   Gross per 24 hour   Intake              575 ml   Output                0 ml   Net              575 ml       Physical Exam:  General: well developed, well nourished  Lungs:  normal respiratory effort   Heart: regular rate   Abdomen: Mildly tender to deep palpation in the right middle abdomen and right mid-lateral back, soft, non-distended; no masses,  no organomegaly  Wound/Incision: clean, dry, intact, no erythema, induration or tenderness to palpation, minimal serosanguinous  discharge on dressing  Extremities: no cyanosis or edema, or clubbing      Laboratory:  CBC:     Recent Labs  Lab 17  0551   WBC 13.08*   RBC 3.71*   HGB 11.1*   HCT 34.1*   *   MCV 92   MCH 29.9   MCHC 32.6     CMP:     Recent Labs  Lab 17  0144 17  0551    67*   CALCIUM 9.1 9.5   ALBUMIN 2.9*  --    PROT 8.0  --     139   K 4.4 5.1   CO2 23 20*    108   BUN 9 7   CREATININE 0.9 0.8   ALKPHOS 97  --    ALT 24  --    AST 31  --    BILITOT 0.3  --    Mg 2.0  Phos 3.9    CT A/P 2017:  Narrative   CT abdomen and pelvis with  IV and oral contrast.    Comparison: The.    Results: 5 mm axial images of the abdomen and pelvis were obtained after the administration of 100 cc of omni-350 IV contrast and oral contrast.    Coronal and sagittal reformat images were generated.    Bilateral small pleural effusion.  The liver, gallbladder, pancreas, spleen, adrenal glands appear normal.  There is ill-defined focal areas of heterogeneous enhancement within the bilateral kidneys, this can be seen with areas of pyelonephritis, to correlate with urinalysis.  The aorta tapers normally,   no para-aortic lymphadenopathy.  There is a small amount of retained stool, mild air distention of the small bowel.  The uterus remain slightly prominent which appears within normal limits after recent .  Just adjacent to the uterus in the right hemipelvis is a small area of air fluid collection measuring approximately 4.0 x 1.8 x 2.8 cm, it could represent a small seroma, hematoma, early forming abscess, a nonopacified loops of bowel is consider less likely.  The appendix is only partially visualized, the portion seen appears normal.  A followup could be done to insure resolution.  The osseous structures appear within normal limits.   Impression     1. Abnormal attenuation of the bilateral kidneys with areas of decreased enhancement concerning for bilateral pyelonephritis.  Followup  retroperitoneal ultrasound or CT recommended to ensure resolution.  2. Small air-fluid collection right to the uterus could represent a postop seroma, hematoma or early forming abscess, followup recommended to ensure resolution.  2. Recent .         ASSESSMENT/PLAN:     Active Hospital Problems    Diagnosis  POA    *Postoperative ileus [K91.3]  Yes    Abdominal pain [R10.9]  Yes    Ileus, postoperative [K91.3]  Yes      Resolved Hospital Problems    Diagnosis Date Resolved POA   No resolved problems to display.       Assessment:    POD#12 s/p 1LTCS 2/2 failure to progress (6cm for 9.5 hours) after PPROM at 35w4d who is admitted for possible postop ileus (complaining of RLQ abdominal pain and back pain).     Plan:  1. Abdominal pain, back pain, c/f possible postop ileus (resolved)  - Continue regular diet  - Afebrile, HR nl, increase in WBC 10.91-- 16.74-->13.08   - AXR: ileus  - CT A/P: 4 x 1.8 x 2.8cm seroma v. Hematoma v. Early abscess (as above)  - F/u AM CBC, BMP, Mg, Phos   - Percocet PRN  - Zofran/Phenergan PRN nausea  -REPEAT ct SCAN TODAY TO R/O EXPANSION OF HEMATOME/ SEROMA, AS PATIENT WITH SIGNIFICANT PAIN WITH LYING FLAT.    2. Wound cellulitis  - Continue PO Clindamycin 300mg q8h  - CULTURES TO BE COLLECTED TO ENSURE APPROP ABX.    3. PreE with SF  - BP: (130-142)/(70-82) 142/70  - Continue Procardia 30 XL    Heidy Arenas MD  PGY-2 OB/GYN  [phone]      Patient has been seen and examined. I agree with the above resident assessment and plan.

## 2017-03-08 VITALS
BODY MASS INDEX: 36.65 KG/M2 | WEIGHT: 220 LBS | DIASTOLIC BLOOD PRESSURE: 82 MMHG | TEMPERATURE: 98 F | HEART RATE: 84 BPM | RESPIRATION RATE: 18 BRPM | SYSTOLIC BLOOD PRESSURE: 138 MMHG | HEIGHT: 65 IN | OXYGEN SATURATION: 97 %

## 2017-03-08 PROBLEM — K91.89 POSTOPERATIVE ILEUS: Status: RESOLVED | Noted: 2017-03-05 | Resolved: 2017-03-08

## 2017-03-08 PROBLEM — K91.89 ILEUS, POSTOPERATIVE: Status: RESOLVED | Noted: 2017-03-05 | Resolved: 2017-03-08

## 2017-03-08 PROBLEM — K56.7 POSTOPERATIVE ILEUS: Status: RESOLVED | Noted: 2017-03-05 | Resolved: 2017-03-08

## 2017-03-08 PROBLEM — K56.7 ILEUS, POSTOPERATIVE: Status: RESOLVED | Noted: 2017-03-05 | Resolved: 2017-03-08

## 2017-03-08 PROBLEM — R10.9 ABDOMINAL PAIN: Status: RESOLVED | Noted: 2017-03-05 | Resolved: 2017-03-08

## 2017-03-08 PROBLEM — N12 PYELONEPHRITIS: Status: ACTIVE | Noted: 2017-03-08

## 2017-03-08 LAB
BACTERIA UR CULT: NORMAL
CANDIDA RRNA VAG QL PROBE: NEGATIVE
G VAGINALIS RRNA GENITAL QL PROBE: NEGATIVE
T VAGINALIS RRNA GENITAL QL PROBE: NEGATIVE

## 2017-03-08 PROCEDURE — 25000003 PHARM REV CODE 250: Performed by: STUDENT IN AN ORGANIZED HEALTH CARE EDUCATION/TRAINING PROGRAM

## 2017-03-08 PROCEDURE — 25000003 PHARM REV CODE 250: Performed by: OBSTETRICS & GYNECOLOGY

## 2017-03-08 PROCEDURE — 87480 CANDIDA DNA DIR PROBE: CPT

## 2017-03-08 PROCEDURE — G0378 HOSPITAL OBSERVATION PER HR: HCPCS

## 2017-03-08 RX ORDER — HYDROCORTISONE 25 MG/G
CREAM TOPICAL 2 TIMES DAILY
Status: DISCONTINUED | OUTPATIENT
Start: 2017-03-08 | End: 2017-03-08 | Stop reason: HOSPADM

## 2017-03-08 RX ORDER — ACETAMINOPHEN 325 MG/1
650 TABLET ORAL EVERY 6 HOURS PRN
Status: DISCONTINUED | OUTPATIENT
Start: 2017-03-08 | End: 2017-03-08 | Stop reason: HOSPADM

## 2017-03-08 RX ORDER — NITROFURANTOIN 25; 75 MG/1; MG/1
100 CAPSULE ORAL 2 TIMES DAILY
Qty: 26 CAPSULE | Refills: 0 | Status: SHIPPED | OUTPATIENT
Start: 2017-03-08 | End: 2017-03-21

## 2017-03-08 RX ORDER — HYDRALAZINE HYDROCHLORIDE 25 MG/1
25 TABLET, FILM COATED ORAL 3 TIMES DAILY
Qty: 90 TABLET | Refills: 11 | Status: SHIPPED | OUTPATIENT
Start: 2017-03-08 | End: 2018-03-08

## 2017-03-08 RX ORDER — LANOLIN ALCOHOL/MO/W.PET/CERES
400 CREAM (GRAM) TOPICAL EVERY 4 HOURS
Status: COMPLETED | OUTPATIENT
Start: 2017-03-08 | End: 2017-03-08

## 2017-03-08 RX ADMIN — HYDROCORTISONE 2.5%: 25 CREAM TOPICAL at 08:03

## 2017-03-08 RX ADMIN — Medication 400 MG: at 08:03

## 2017-03-08 RX ADMIN — OXYCODONE HYDROCHLORIDE AND ACETAMINOPHEN 1 TABLET: 5; 325 TABLET ORAL at 01:03

## 2017-03-08 RX ADMIN — NIFEDIPINE 30 MG: 30 TABLET, FILM COATED, EXTENDED RELEASE ORAL at 08:03

## 2017-03-08 RX ADMIN — CLINDAMYCIN HYDROCHLORIDE 300 MG: 150 CAPSULE ORAL at 02:03

## 2017-03-08 RX ADMIN — CLINDAMYCIN HYDROCHLORIDE 300 MG: 150 CAPSULE ORAL at 05:03

## 2017-03-08 RX ADMIN — OXYCODONE HYDROCHLORIDE AND ACETAMINOPHEN 1 TABLET: 5; 325 TABLET ORAL at 03:03

## 2017-03-08 RX ADMIN — Medication 400 MG: at 12:03

## 2017-03-08 RX ADMIN — Medication 400 MG: at 04:03

## 2017-03-08 NOTE — PLAN OF CARE
Problem: Patient Care Overview  Goal: Plan of Care Review  Outcome: Ongoing (interventions implemented as appropriate)  Incision with lighter area possibly adipose exposed about 2cm left left of center of incision. Small amount of tan/serous/yellow drainage with no odor noted. Pt keeps a bree pad inside panties to protect incision from rubbing/friction. Continues to complain of pain on the right lateral side extending from flank to RLQ of abdomin. Bleeding is scant brownish. Pt is up ad jake in room and doing self cares. Had BM X2 this shift (one large one small) states stool is soft and loose. Bowel sounds initially hypoactive but normoactive after BM. No distress noted VSS

## 2017-03-08 NOTE — LACTATION NOTE
"Spoke with patient about breastfeeding and pumping. Baby in mother's room in open crib. Patient states she has not pumped or nursed baby for past 2-3 days. Her breasts are not painful and feel less full in past day. Patient requested information on "weaning" from pumping and breastfeeding. Discussed that her milk supply has diminished already and encouraged to wear supportive not tight bra for comfort and hand expressing small amount of milk until comfort. Also reviewed ways to increase her milk supply if she changes her decision and signs and symptoms of mastitis. Lactation number left on wipe off board to call as needed.   "

## 2017-03-08 NOTE — DISCHARGE SUMMARY
[facility]  Discharge Summary  Obstetrics - Antepartum      Admit Date: 3/5/2017    Discharge Date and Time:  03/08/2017 1:40 PM    Discharge Attending Physician: Sekou Sims MD     Discharge Provider: Same    Reason for Admission: post operative ileus    Hospital Course (synopsis of major diagnoses, care, treatment, and services provided during the course of the hospital stay): Patient is a 36 y.o. POD#13 s/p 1LTCS 2/2 failure to progress (6cm for 9.5 hours) after PPROM at 35w4d who was admitted on POD#10 for management of a post operative ileus.  On admission she was started on bowel rest.  By HD#2, she had return of bowel function however she continued to report right sided pain.  A CT scan was repeated which showed possible pyelonephritis.  She was started on rocephin IV and by HD#4 her pain had improved.  She had been afebrile throughout her stay.  She was switched from procardia to hydralazine due to headaches.  At the time of discharge on HD#4 she reported minimal flank pain.  She was tolerating a PO diet.  She was afebrile.  She was discharged home in good condition with instructions to follow up with her primary OBGYN in 2 days for blood pressure and pyelonephritis follow up.  She was given instructed to continue her clindamycin which she had been getting for wound cellulitis and start macrobid for pyelonephritis.      Significant Diagnostic Studies: Radiology: CT scan: CT ABDOMEN PELVIS WITH CONTRAST:   Results for orders placed or performed during the hospital encounter of 03/05/17   CT Abdomen Pelvis With Contrast    Narrative    CT of the abdomen and pelvis was performed following the intravenous demonstration of 100 cc Omnipaque 350.  No oral contrast was administered.  Delayed images not obtained due to her age. sagittal and coronal reformatted images submitted.    CT 03/05/2017 prior films for comparison.    Findings: The lung bases demonstrate almost complete resolution  of left pleural effusion.  Small right effusion with some persistent atelectasis.  No pericardial fluid.    In the abdomen the liver, pancreas and spleen are unremarkable.  High-density material in gallbladder likely sludge.  No adrenal masses.    Again identified is heterogeneous attenuation of the kidneys.  Delayed images not obtained to evaluate for excretion.    The aorta tapers normally and there is no significant para-aortic adenopathy.  Normal sized para-aortic nodes are visualized.    In the pelvis no significant pelvic adenopathy.  Uterus is enlarged in this postpartum patient.  There has been decrease in the size of the air and fluid collection just to the right of the uterus today measuring approximately 2.1 x 1.3 x 2 cm in size.  Decrease in the subcutaneous air and soft tissue stranding as well.  Bladder is unremarkable.  Visualized loops of bowel are not dilated.  Bone windows demonstrate no lytic or blastic lesions.    Impression 1.  Decrease in the size of the air-fluid collection in the right periuterine location today measuring 2.1 x 1.3 x 2 cm in size.    2 continued heterogeneous enhancement of the kidneys.  Correlation with clinical symptoms to help exclude pyelonephritis.    Enlarged postpartum uterus.      Electronically signed by: LEXIE JON MD  Date:     03/07/17  Time:    14:09     and CT ABDOMEN PELVIS WITHOUT CONTRAST: No results found for this visit on 03/05/17.    Final Diagnoses:   Principal Problem: Postoperative ileus   Secondary Diagnoses:   Active Hospital Problems    Diagnosis  POA    Pyelonephritis [N12]  Yes      Resolved Hospital Problems    Diagnosis Date Resolved POA    *Postoperative ileus [K91.3] 03/08/2017 Yes    Abdominal pain [R10.9] 03/08/2017 Yes    Ileus, postoperative [K91.3] 03/08/2017 Yes       Discharged Condition: good    Disposition: Home or Self Care    Follow Up/Patient Instructions:     Medications:  Reconciled Home Medications:   Current Discharge  Medication List      START taking these medications    Details   hydrALAZINE (APRESOLINE) 25 MG tablet Take 1 tablet (25 mg total) by mouth 3 (three) times daily.  Qty: 90 tablet, Refills: 11      nitrofurantoin, macrocrystal-monohydrate, (MACROBID) 100 MG capsule Take 1 capsule (100 mg total) by mouth 2 (two) times daily.  Qty: 26 capsule, Refills: 0         CONTINUE these medications which have NOT CHANGED    Details   butalbital-acetaminophen-caffeine -40 mg (FIORICET, ESGIC) -40 mg per tablet Take 1 tablet by mouth every 4 (four) hours as needed for Pain.  Qty: 20 tablet, Refills: 1      clindamycin (CLEOCIN) 150 MG capsule Take 2 capsules (300 mg total) by mouth every 8 (eight) hours.  Qty: 42 capsule, Refills: 0      ibuprofen (ADVIL,MOTRIN) 600 MG tablet Take 1 tablet (600 mg total) by mouth 3 (three) times daily.  Qty: 30 tablet, Refills: 2    Associated Diagnoses:  premature rupture of membranes (PPROM) delivered, current hospitalization;  delivery delivered; S/P  section      oxycodone-acetaminophen (PERCOCET) 5-325 mg per tablet Take 1 tablet by mouth every 4 (four) hours as needed.  Qty: 20 tablet, Refills: 0    Associated Diagnoses:  premature rupture of membranes (PPROM) delivered, current hospitalization;  delivery delivered; S/P  section      prenatal #108-iron,carbonyl-FA 30-1 mg Tab Take 1 tablet by mouth once daily.  Qty: 30 tablet, Refills: 6         STOP taking these medications       nifedipine 30 MG ORAL TR24 (PROCARDIA-XL) 30 MG (OSM) 24 hr tablet Comments:   Reason for Stopping:               Discharge Procedure Orders  Diet general     Activity as tolerated     Call MD for:  temperature >100.4     Call MD for:  persistent nausea and vomiting or diarrhea     Call MD for:  severe uncontrolled pain     Call MD for:  redness, tenderness, or signs of infection (pain, swelling, redness, odor or green/yellow discharge around incision site)      Call MD for:  difficulty breathing or increased cough     Call MD for:  severe persistent headache     Call MD for:  persistent dizziness, light-headedness, or visual disturbances       Follow-up Information     Follow up with Sekou Sims MD In 2 days.    Specialties:  Obstetrics, Obstetrics and Gynecology    Why:  Blood Pressure check    Contact information:    [address] [address]  [phone]            Total time spent discharging patient: 35 Minutes

## 2017-03-08 NOTE — PROGRESS NOTES
"PROGRESS NOTE  ANTEPARTUM    Admit Date: 3/5/2017   LOS: 0 days     Reason for Admission:  Postoperative ileus    SUBJECTIVE:     Dao Holt is a 36 y.o. female, POD#13 s/p 1LTCS 2/2 failure to progress (6cm for 9.5 hours) after PPROM at 35w4d who was admitted for possible postop ileus (complaining of RLQ abdominal pain and back pain) - ileus resolved. CT read yesterday c/f possible pyelonephritis for which she was administered Rocephin IV x1.     This morning, patient reports improvement in pain. Reports that she was able to lie more flat last night and awoke only once with pain, resolved with Percocet. Denies pain this morning. Reports +flatus, ++ BM yesterday and this morning. Tolerating a regular diet with no N/V. Denies F/C.  Denies dysuria, frequency, urgency.   This morning, the patient's complaints are a minimal vaginal "burning" which she is concerned could be 2/2 a yeast infection but she denies abnormal discharge. She also complains of minimal bleeding which she is unsure is coming from her rectum v. Her vagina and a "bubbly" feeling around her rectum which she is concerned could be hemorrhoids.    Scheduled Meds:   clindamycin  300 mg Oral Q8H    nifedipine 30 MG ORAL TR24  30 mg Oral Daily    sodium chloride 0.9%  3 mL Intravenous Q8H     Continuous Infusions:     PRN Meds:acetaminophen, omnipaque 350 iohexol, ondansetron, oxycodone-acetaminophen, promethazine (PHENERGAN) IVPB    Review of patient's allergies indicates:  No Known Allergies    OBJECTIVE:     Vital Signs (Most Recent)  Temp: 98.1 °F (36.7 °C) (03/07/17 2325)  Pulse: 84 (03/07/17 2325)  Resp: 18 (03/07/17 2325)  BP: 133/82 (03/07/17 2325)  SpO2: 100 % (03/07/17 2325)    Temperature Range Min/Max (Last 24H):  Temp:  [97.8 °F (36.6 °C)-98.6 °F (37 °C)]     Vital Signs Range (Last 24H):  Temp:  [97.8 °F (36.6 °C)-98.6 °F (37 °C)]   Pulse:  [77-86]   Resp:  [18]   BP: (120-145)/(70-82)   SpO2:  [97 %-100 %]     I & O (Last " 24H):    Intake/Output Summary (Last 24 hours) at 03/08/17 0553  Last data filed at 03/08/17 0300   Gross per 24 hour   Intake             1740 ml   Output                0 ml   Net             1740 ml       Physical Exam:  General: well developed, well nourished  Lungs:  normal respiratory effort   Heart: regular rate   Abdomen: Very minimal tenderness to deep palpation in the right middle abdomen and right mid-lateral back, soft, non-distended; no masses,  no organomegaly  Wound/Incision: clean, dry, intact, no erythema, induration or tenderness to palpation, no discharge  Extremities: no cyanosis or edema, or clubbing      Laboratory:  CBC:     Recent Labs  Lab 03/07/17  0613   WBC 12.68   RBC 3.63*   HGB 10.8*   HCT 32.6*   *   MCV 90   MCH 29.8   MCHC 33.1     CMP:     Recent Labs  Lab 03/05/17  0144  03/07/17  0613     < > 79   CALCIUM 9.1  < > 9.5   ALBUMIN 2.9*  --   --    PROT 8.0  --   --      < > 136   K 4.4  < > 4.5   CO2 23  < > 23     < > 104   BUN 9  < > 7   CREATININE 0.9  < > 0.8   ALKPHOS 97  --   --    ALT 24  --   --    AST 31  --   --    BILITOT 0.3  --   --    < > = values in this interval not displayed.   Mg 1.7  Phos 4.1    UA WBC 50, Bacteria occasional    CT A/P 03/05/2017:  Narrative   CT abdomen and pelvis with  IV and oral contrast.    Comparison: The.    Results: 5 mm axial images of the abdomen and pelvis were obtained after the administration of 100 cc of omni-350 IV contrast and oral contrast.    Coronal and sagittal reformat images were generated.    Bilateral small pleural effusion.  The liver, gallbladder, pancreas, spleen, adrenal glands appear normal.  There is ill-defined focal areas of heterogeneous enhancement within the bilateral kidneys, this can be seen with areas of pyelonephritis, to correlate with urinalysis.  The aorta tapers normally,   no para-aortic lymphadenopathy.  There is a small amount of retained stool, mild air distention of the  small bowel.  The uterus remain slightly prominent which appears within normal limits after recent .  Just adjacent to the uterus in the right hemipelvis is a small area of air fluid collection measuring approximately 4.0 x 1.8 x 2.8 cm, it could represent a small seroma, hematoma, early forming abscess, a nonopacified loops of bowel is consider less likely.  The appendix is only partially visualized, the portion seen appears normal.  A followup could be done to insure resolution.  The osseous structures appear within normal limits.   Impression     1. Abnormal attenuation of the bilateral kidneys with areas of decreased enhancement concerning for bilateral pyelonephritis.  Followup retroperitoneal ultrasound or CT recommended to ensure resolution.  2. Small air-fluid collection right to the uterus could represent a postop seroma, hematoma or early forming abscess, followup recommended to ensure resolution.  2. Recent .       CT A/P 2017  Narrative   CT of the abdomen and pelvis was performed following the intravenous demonstration of 100 cc Omnipaque 350.  No oral contrast was administered.  Delayed images not obtained due to her age. sagittal and coronal reformatted images submitted.    CT 2017 prior films for comparison.    Findings: The lung bases demonstrate almost complete resolution of left pleural effusion.  Small right effusion with some persistent atelectasis.  No pericardial fluid.    In the abdomen the liver, pancreas and spleen are unremarkable.  High-density material in gallbladder likely sludge.  No adrenal masses.    Again identified is heterogeneous attenuation of the kidneys.  Delayed images not obtained to evaluate for excretion.    The aorta tapers normally and there is no significant para-aortic adenopathy.  Normal sized para-aortic nodes are visualized.    In the pelvis no significant pelvic adenopathy.  Uterus is enlarged in this postpartum patient.  There has been  "decrease in the size of the air and fluid collection just to the right of the uterus today measuring approximately 2.1 x 1.3 x 2 cm in size.  Decrease in the subcutaneous air and soft tissue stranding as well.  Bladder is unremarkable.  Visualized loops of bowel are not dilated.  Bone windows demonstrate no lytic or blastic lesions.    Impression 1.  Decrease in the size of the air-fluid collection in the right periuterine location today measuring 2.1 x 1.3 x 2 cm in size.    2 continued heterogeneous enhancement of the kidneys.  Correlation with clinical symptoms to help exclude pyelonephritis.    Enlarged postpartum uterus.       ASSESSMENT/PLAN:     Active Hospital Problems    Diagnosis  POA    *Postoperative ileus [K91.3]  Yes    Abdominal pain [R10.9]  Yes    Ileus, postoperative [K91.3]  Yes      Resolved Hospital Problems    Diagnosis Date Resolved POA   No resolved problems to display.       Assessment:    POD#13 s/p 1LTCS 2/2 failure to progress (6cm for 9.5 hours) after PPROM at 35w4d who is admitted for possible postop ileus (complaining of RLQ abdominal pain and back pain).     Plan:  1. Abdominal pain, back pain, c/f possible postop ileus (resolved), c/f possible pyelo  - Continue regular diet  - Afebrile, HR nl, increase in WBC 10.91-- 16.74-->13.08-->12.7  - AXR: ileus  - CT A/P: 4 x 1.8 x 2.8cm seroma v. Hematoma v. Early abscess (as above)  - Repeat CT A/P yesterday with interval decrease in size of collection, c/f possible pyelo  - UA WBC 50, Bacteria occasional  - F/u UCx  - S/p Rocephin IV x1  - AM CBC, BMP, Mg, Phos as above--> replace Mg  - Percocet PRN  - Zofran/Phenergan PRN nausea  - D/c home with Macrobid x 7 days followed by prophylaxis    2. Wound cellulitis  - Continue PO Clindamycin 300mg q8h  - F/u cultures to ensure appropriate antibiotic coverage    3. PreE with SF  - BP: (120-145)/(70-82) 133/82  - Continue Procardia 30 XL    4. Minimal vaginal "burning"/irritation  - Will " collect Affirm    Heidy Arenas MD  PGY-2 OB/GYN  [phone]

## 2017-03-08 NOTE — PLAN OF CARE
Problem: Patient Care Overview  Goal: Plan of Care Review  Outcome: Ongoing (interventions implemented as appropriate)  Lactation note:  The patient states she has not really pumped the last 2 days due to abdominal pain. We discussed how the breasts work with supply and demand and her risk for a low milk supply. Encouraged pumping and/or hand expression as often as she is able to do it knowing that the goal is 8 times daily. The patient has a symphony breast pump at her bedside to use as needed. The patient is aware of how to store breast milk on the unit but has decided to pump and dump for now due to medication. The patient has no further questions and will call for help as needed.

## 2017-03-10 ENCOUNTER — TELEPHONE (OUTPATIENT)
Dept: OBSTETRICS AND GYNECOLOGY | Facility: CLINIC | Age: 37
End: 2017-03-10

## 2017-03-10 LAB — BACTERIA SPEC AEROBE CULT: NORMAL

## 2017-03-10 NOTE — TELEPHONE ENCOUNTER
----- Message from Lilian Aguilar sent at 3/10/2017  8:25 AM CST -----  Contact: self  Patient is calling in regards to an appointment, patient states per Dr. Sims, she was instructed to come in today, Friday March 10th, for a blood pressure check. Patient can be reached at [phone].

## 2017-03-10 NOTE — TELEPHONE ENCOUNTER
----- Message from Lilian Aguilar sent at 3/10/2017  8:25 AM CST -----  Contact: self  Patient is calling in regards to an appointment, patient states per Dr. Sims, she was instructed to come in today, Friday March 10th, for a blood pressure check. Patient can be reached at [phone].

## 2017-03-12 ENCOUNTER — HOSPITAL ENCOUNTER (EMERGENCY)
Facility: OTHER | Age: 37
Discharge: HOME | End: 2017-03-12
Attending: OBSTETRICS & GYNECOLOGY
Payer: MEDICAID

## 2017-03-12 VITALS
RESPIRATION RATE: 18 BRPM | DIASTOLIC BLOOD PRESSURE: 63 MMHG | SYSTOLIC BLOOD PRESSURE: 120 MMHG | OXYGEN SATURATION: 99 % | HEART RATE: 77 BPM | TEMPERATURE: 98 F

## 2017-03-12 DIAGNOSIS — L76.34 POSTPROCEDURAL SEROMA OF SKIN AND SUBCUTANEOUS TISSUE FOLLOWING OTHER PROCEDURE: Primary | ICD-10-CM

## 2017-03-12 PROCEDURE — 99283 EMERGENCY DEPT VISIT LOW MDM: CPT

## 2017-03-12 PROCEDURE — 99283 EMERGENCY DEPT VISIT LOW MDM: CPT | Mod: ,,, | Performed by: OBSTETRICS & GYNECOLOGY

## 2017-03-12 NOTE — ED NOTES
VSS. Pt afebrile. Denies pain at wound site. No redness or warmth noted around incision. Pressure dressing applied per MD order. Pt instructed to leave pressure dressing on for 24 hr. Pt has follow up appt with Dr. Calderón on Monday. Infection signs and symptoms reviewed with pt. Pt verbalized understanding and had no further questions at this time. Ambulated from OB ED with significant other and baby. No signs of distress

## 2017-03-12 NOTE — ED PROVIDER NOTES
Encounter Date: 3/12/2017       History     Chief Complaint   Patient presents with    Drainage from Incision     Review of patient's allergies indicates:  No Known Allergies  HPI Comments: Dao Holt is a 36 y.o. [id number] at who is s/p 1LTCS on 17 presents complaining of drainage from incision.   Her postpartum course has been complicated by pre-eclampsia with postpartum onset, wound infection, post-operative ileus and pyelonephritis. She is currently on clindamycin for wound infection. Drainage has been clear yellow, denies pus. Denies warmth, swelling, worsening tenderness around incision  Patient denies F/C, N/V. Reports headaches yesterday that improved somewhat with fioricet, denies HA currently. Denies vision changes/CP/SOB. Denies lightheadedness/dizziness. Denies RUQ or epigastric pain.        No past medical history on file.  No past surgical history on file.  Family History   Problem Relation Age of Onset    Breast cancer Mother     Breast cancer Maternal Aunt     Colon cancer Neg Hx     Ovarian cancer Neg Hx      Social History   Substance Use Topics    Smoking status: Former Smoker    Smokeless tobacco: Not on file    Alcohol use No     Review of Systems   Constitutional: Negative for chills and fever.   Eyes: Negative for visual disturbance.   Respiratory: Negative for shortness of breath.    Cardiovascular: Negative for chest pain.   Gastrointestinal: Negative for abdominal distention, abdominal pain, constipation, nausea and vomiting.   Genitourinary: Negative for dysuria and vaginal bleeding.   Skin: Positive for wound.        C/s incision   Neurological: Negative for weakness and light-headedness.       Physical Exam   Initial Vitals   BP Pulse Resp Temp SpO2   17 0112 17 0111 17 0111 17 0111 17 0111   134/68 97 18 97.5 °F (36.4 °C) 97 %     Physical Exam    Constitutional: She appears well-developed and well-nourished. She is not diaphoretic. No  distress.   HENT:   Head: Normocephalic and atraumatic.   Eyes: EOM are normal.   Neck: Normal range of motion. Neck supple.   Cardiovascular: Normal rate and regular rhythm.   Pulmonary/Chest: No respiratory distress.   Abdominal: Soft. She exhibits no distension. There is no tenderness. There is no rebound and no guarding.   Pfannenstiel incision intact. Approximately 3cm area of granulation tissue towards left of midline with small area draining serous fluid. No erythema, edema, tenderness surrounding incision.   Neurological: She is alert and oriented to person, place, and time.   Skin: Skin is warm and dry. No rash noted. No erythema.   Psychiatric: She has a normal mood and affect. Her behavior is normal. Judgment and thought content normal.         ED Course   Procedures  Labs Reviewed - No data to display                       Attending Attestation:   Physician Attestation Statement for Resident:  As the supervising MD   Physician Attestation Statement: I have personally seen and examined this patient.   I agree with the above history. -:   As the supervising MD I agree with the above PE.    As the supervising MD I agree with the above treatment, course, plan, and disposition.  I have reviewed the following: old records at this facility.                    ED Course     Clinical Impression:   The primary encounter diagnosis was Postprocedural seroma of skin and subcutaneous tissue following other procedure. A diagnosis of Wound healing well on examination was also pertinent to this visit.    - no signs of infection on exam  - serous fluid draining from incision, pressure dressing placed   - patient to continue course of clindamycin as directed for prior wound infection. Good MRSA/anaerobe coverage    Patient to follow-up with Dr. Sims for BP/wound check tomorrow. Asymptomatic from preE standpoint. BP normal range. On Hydralazine 25 tid.         Tatiana Slaughter MD  Resident  03/12/17 0154        Clara Liriano, DO  03/15/17 0742

## 2017-03-12 NOTE — ED TRIAGE NOTES
Pt delivered 17 via . Pt is 17 days PP. Came to OB ED c/o clear, nonodorous fluid leaking from her abdominal incision. VSS. Pt afebrile. Dr. Slaughter notified that pt is here. Will continue to monitor

## 2017-03-12 NOTE — ED AVS SNAPSHOT
[facility]  [address]  [facility] [address]               Dao Holt   3/12/2017  1:08 AM   ED    Description:  Female : 1980   Department:  [facility]           Your Care was Coordinated By:     Provider Role From To    Clara Liriano DO Attending Provider 17 0132 --      Reason for Visit     Drainage from Incision           Diagnoses this Visit        Comments    Wound healing well on examination    -  Primary       ED Disposition     ED Disposition Condition Comment    Discharge             To Do List           Follow-up Information     Follow up with Sekou Sims MD. Go on 3/13/2017.    Specialties:  Obstetrics, Obstetrics and Gynecology    Why:  BP check, wound check    Contact information:    [address] [facility] [address] [facility] [address]  [facility] [address]  [phone]        [facility]     [facility] -  Assistance  Registered nurses in the [facility] provide clinical advisement, health education, appointment booking, and other advisory services.  Call for this free service at [phone].             Medications           Message regarding Medications     Verify the changes and/or additions to your medication regime listed below are the same as discussed with your clinician today.  If any of these changes or additions are incorrect, please notify your healthcare provider.             Verify that the below list of medications is an accurate representation of the medications you are currently taking.  If none reported, the list may be blank. If incorrect, please contact your healthcare provider. Carry this list with you in case of emergency.           Current Medications     butalbital-acetaminophen-caffeine -40 mg (FIORICET, ESGIC) -40 mg per tablet Take 1 tablet by mouth every 4 (four) hours as needed for Pain.    hydrALAZINE (APRESOLINE) 25 MG tablet Take 1 tablet (25 mg total)  by mouth 3 (three) times daily.    ibuprofen (ADVIL,MOTRIN) 600 MG tablet Take 1 tablet (600 mg total) by mouth 3 (three) times daily.    nitrofurantoin, macrocrystal-monohydrate, (MACROBID) 100 MG capsule Take 1 capsule (100 mg total) by mouth 2 (two) times daily.    oxycodone-acetaminophen (PERCOCET) 5-325 mg per tablet Take 1 tablet by mouth every 4 (four) hours as needed.    prenatal #108-iron,carbonyl-FA 30-1 mg Tab Take 1 tablet by mouth once daily.           Clinical Reference Information           Your Vitals Were     BP Pulse Temp Resp Last Period SpO2    120/63 77 97.5 °F (36.4 °C) (Temporal) 18 06/19/2016 99%      Allergies as of 3/12/2017     No Known Allergies      Immunizations Administered on Date of Encounter - 3/12/2017     None      ED Micro, Lab, POCT     None      ED Imaging Orders     None        Discharge Instructions                 Your Scheduled Appointments     Mar 13, 2017 10:15 AM CDT   Post Partum with Sekou Sims MD   [facility] - OB/GYN 5th Floor ([facility] )    [address]  [place] [address] [phone]            Mar 14, 2017  9:15 AM CDT   Post Partum with Sekou Sims MD   [facility] [place] - OB/GYN [address] ([facility])    [address] [address] [phone]              Smoking Cessation     If you would like to quit smoking:   You may be eligible for free services if you are a [place] resident and started smoking cigarettes before September 1, 1988.  Call the Smoking Cessation Trust (SCT) toll free at (783) 565-4210 or (595) 089-2371.   Call 3-708-QUIT-NOW if you do not meet the above criteria.             [facility] complies with applicable Federal civil rights laws and does not discriminate on the basis of race, color, national origin, age, disability, or sex.        Language Assistance Services     ATTENTION: Language assistance services are available, free of charge. Please call [phone].       ATENCIÓN: Si habla español, tiene a kent disposición servicios gratuitos de asistencia lingüística. Llame al [phone].     CHÚ Ý: N?u b?n nói Ti?ng Vi?t, có các d?ch v? h? tr? ngôn ng? mi?n phí dành cho b?n. G?i s? [phone].

## 2017-03-13 ENCOUNTER — POSTPARTUM VISIT (OUTPATIENT)
Dept: OBSTETRICS AND GYNECOLOGY | Facility: CLINIC | Age: 37
End: 2017-03-13
Payer: MEDICAID

## 2017-03-13 VITALS — SYSTOLIC BLOOD PRESSURE: 122 MMHG | BODY MASS INDEX: 32.61 KG/M2 | WEIGHT: 196 LBS | DIASTOLIC BLOOD PRESSURE: 70 MMHG

## 2017-03-13 DIAGNOSIS — Z98.890 POSTOPERATIVE STATE: Primary | ICD-10-CM

## 2017-03-13 LAB — BACTERIA SPEC ANAEROBE CULT: NORMAL

## 2017-03-13 PROCEDURE — 0503F POSTPARTUM CARE VISIT: CPT | Mod: ,,, | Performed by: OBSTETRICS & GYNECOLOGY

## 2017-03-13 PROCEDURE — 87186 SC STD MICRODIL/AGAR DIL: CPT

## 2017-03-13 PROCEDURE — 99999 PR PBB SHADOW E&M-EST. PATIENT-LVL II: CPT | Mod: PBBFAC,,, | Performed by: OBSTETRICS & GYNECOLOGY

## 2017-03-13 PROCEDURE — 87077 CULTURE AEROBIC IDENTIFY: CPT

## 2017-03-13 PROCEDURE — 99212 OFFICE O/P EST SF 10 MIN: CPT | Mod: PBBFAC | Performed by: OBSTETRICS & GYNECOLOGY

## 2017-03-13 PROCEDURE — 87070 CULTURE OTHR SPECIMN AEROBIC: CPT

## 2017-03-13 PROCEDURE — 87075 CULTR BACTERIA EXCEPT BLOOD: CPT

## 2017-03-13 NOTE — PROGRESS NOTES
CC: BP / INCISION CHECK    HPI:  Dao Holt is a 36 y.o. female  who presents for an incision check.  She is S/P a  on 17 secondary to arrest of dilation.  Her postpartum course has been remarkable for readmission secondary to pre-eclampsia with postpartum onset, wound infection, post-operative ileus and pyelonephritis.  She was seen in the OB ER yesterday for evaluation of drainage of clear yellow fluid from the incision.  No fever.  No bowel / bladder complaints.  She is currently on hydralazine with well controlled BP.      Delivery Date: 17  Delivery MD: Dr. Sims    Pregnancy was complicated by:  AMA  Sickle cell trait  Post-partal pre-eclampsia  Pyelonephritis  Post-op ileus      /70  Wt 88.9 kg (195 lb 15.8 oz)  LMP 2016  BMI 32.61 kg/m2    PHYSICAL EXAM:  ABDOMEN:  Soft, non-tender, non-distended  INCISION: Area of granulation tissue 3cm near midline with minimal area of drainage of clear yellow fluid, consistent with seroma- cultured.  No pus, no erythema.  No sign of infection.    IMP:  Two weeks S/P C/S  BP well controlled on Hydralazine  Incision seroma - no sign of infection      PLAN:   Instructions / precautions reviewed - bid incision care  Continue Hydralazine for BP  Continue Clindamycin  Return: 2 weeks for PP check

## 2017-03-16 ENCOUNTER — TELEPHONE (OUTPATIENT)
Dept: OBSTETRICS AND GYNECOLOGY | Facility: CLINIC | Age: 37
End: 2017-03-16

## 2017-03-16 LAB — BACTERIA SPEC AEROBE CULT: NORMAL

## 2017-03-16 RX ORDER — SULFAMETHOXAZOLE AND TRIMETHOPRIM 400; 80 MG/1; MG/1
1 TABLET ORAL 2 TIMES DAILY
Qty: 20 TABLET | Refills: 0 | Status: SHIPPED | OUTPATIENT
Start: 2017-03-16 | End: 2017-03-26

## 2017-03-16 NOTE — TELEPHONE ENCOUNTER
Called patient:    Discussed results of culture:     Staphylococcus epidermidis          CULTURE, AEROBIC  (SPECIFY SOURCE)         Clindamycin >4 mcg/mL Resistant         Erythromycin >4 mcg/mL Resistant         Oxacillin >2 mcg/mL Resistant         Penicillin >8 mcg/mL Resistant         Tetracycline <=4 mcg/mL Sensitive         Trimeth/Sulfa <=0.5/9.5 m... Sensitive         Vancomycin 2 mcg/mL Sensitive         She is currently taking Clindamycin.  Drainage from the incision is now much less.  Reports that she is now formula feeding.    REC:    Stop Clindamycin  Rx Bactrim

## 2017-03-18 ENCOUNTER — PATIENT MESSAGE (OUTPATIENT)
Dept: OBSTETRICS AND GYNECOLOGY | Facility: CLINIC | Age: 37
End: 2017-03-18

## 2017-03-20 ENCOUNTER — PATIENT MESSAGE (OUTPATIENT)
Dept: OBSTETRICS AND GYNECOLOGY | Facility: CLINIC | Age: 37
End: 2017-03-20

## 2017-03-20 ENCOUNTER — TELEPHONE (OUTPATIENT)
Dept: OBSTETRICS AND GYNECOLOGY | Facility: CLINIC | Age: 37
End: 2017-03-20

## 2017-03-20 LAB — BACTERIA SPEC ANAEROBE CULT: NORMAL

## 2017-03-20 NOTE — TELEPHONE ENCOUNTER
Test Results            Dao iSms MD 2 days ago                        I have a question about CULTURE, AEROBIC resulted on 3/16/17 at 12:11 PM.     What does this means?                        Test Results

## 2017-03-21 ENCOUNTER — POSTPARTUM VISIT (OUTPATIENT)
Dept: OBSTETRICS AND GYNECOLOGY | Facility: CLINIC | Age: 37
End: 2017-03-21
Attending: OBSTETRICS & GYNECOLOGY
Payer: MEDICAID

## 2017-03-21 VITALS
DIASTOLIC BLOOD PRESSURE: 76 MMHG | HEIGHT: 66 IN | SYSTOLIC BLOOD PRESSURE: 122 MMHG | BODY MASS INDEX: 31.18 KG/M2 | WEIGHT: 194 LBS

## 2017-03-21 DIAGNOSIS — N76.0 ACUTE VAGINITIS: Primary | ICD-10-CM

## 2017-03-21 LAB
CANDIDA RRNA VAG QL PROBE: NEGATIVE
G VAGINALIS RRNA GENITAL QL PROBE: NEGATIVE
T VAGINALIS RRNA GENITAL QL PROBE: NEGATIVE

## 2017-03-21 PROCEDURE — 87480 CANDIDA DNA DIR PROBE: CPT

## 2017-03-21 PROCEDURE — 99213 OFFICE O/P EST LOW 20 MIN: CPT | Mod: PBBFAC | Performed by: OBSTETRICS & GYNECOLOGY

## 2017-03-21 PROCEDURE — 99999 PR PBB SHADOW E&M-EST. PATIENT-LVL III: CPT | Mod: PBBFAC,,, | Performed by: OBSTETRICS & GYNECOLOGY

## 2017-03-21 NOTE — PROGRESS NOTES
"CC: Post-partum follow-up    Dao Holt is a 36 y.o. female  who presents for post-partum visit.  She is 4 weeks S/P a  on 17 secondary to arrest of dilation. Her postpartum course has been remarkable for readmission secondary to pre-eclampsia with postpartum onset, wound infection, post-operative ileus, and pyelonephritis.  She is currently on hydralazine with well controlled BP.  She is also taking Bactrim for the incisional drainage.  She is no longer having drainage from the incision and describes less discomfort.  Reports increased vaginal itching.  Bottle feeding.  The baby is doing well.  [place] out of [place] this [place].        Pregnancy was complicated by:  AMA  Sickle cell trait  Post-partal pre-eclampsia  Pyelonephritis  Post-op ileus    Delivery Date: 17  Delivery MD: Dr. Sims  Gender: male  Breast Feeding: NO  Depression: NO  Contraception: condoms      /76  Ht 5' 6" (1.676 m)  Wt 88 kg (194 lb 0.1 oz)  LMP 2016  Breastfeeding? No  BMI 31.31 kg/m2    ROS:  GENERAL: No fever, chills, fatigability.  VULVAR: No pain, no lesions and no itching.  VAGINAL: No relaxation, no itching, no discharge, no abnormal bleeding and no lesions.  ABDOMEN: No abdominal pain. Denies nausea. Denies vomiting. No diarrhea. No constipation  BREAST: Denies pain. No lumps.  URINARY: No incontinence, no nocturia, no frequency and no dysuria.  CARDIOVASCULAR: No chest pain. No shortness of breath. No leg cramps.  NEUROLOGICAL: No headaches. No vision changes.    PHYSICAL EXAM:  Exam chaperoned by nurse  ABDOMEN:  Soft, non-tender, non-distended  INCISION: Intact.  No drainage.  No erythema.  Area of granulation tissue in midline of incision is healing.  VULVA:  Normal, no lesions  CERVIX:  Without lesions, polyps or tenderness.  Affirm performed.  UTERUS:  Normal size, shape, consistency, no mass or tenderness.  ADNEXA:  Normal in size without mass or " tenderness    IMP:  Doing well 4 weeks S/P C/S  Incisions healing well  BP well controlled on Hydralazine.  Instructions / precautions reviewed  Contraceptive counseling  Vaginitis      PLAN:  May resume normal activities in 2-3 weeks  We will contact her with results of the Affirm  Finish Bactrim   Stop Hydralazine in 2 weeks, then check BP and let us know  To become established with GYN in her new location

## 2017-03-21 NOTE — MR AVS SNAPSHOT
[facility] [address]  [address]  [facility] [address]  Phone: [phone]  Fax: [phone]                  Dao Holt   3/21/2017 3:15 PM   Postpartum Visit    Description:  Female : 1980   Provider:  Sekou Sims MD   Department:  [facility] [address]           Reason for Visit     Postpartum Follow-up                To Do List           Goals (5 Years of Data)     None      [facility] On Call     [facility] On Call Nurse [facility] -  Assistance  Registered nurses in the [facility] On Call Center provide clinical advisement, health education, appointment booking, and other advisory services.  Call for this free service at [phone].             Medications           Message regarding Medications     Verify the changes and/or additions to your medication regime listed below are the same as discussed with your clinician today.  If any of these changes or additions are incorrect, please notify your healthcare provider.        STOP taking these medications     oxycodone-acetaminophen (PERCOCET) 5-325 mg per tablet Take 1 tablet by mouth every 4 (four) hours as needed.    ibuprofen (ADVIL,MOTRIN) 600 MG tablet Take 1 tablet (600 mg total) by mouth 3 (three) times daily.           Verify that the below list of medications is an accurate representation of the medications you are currently taking.  If none reported, the list may be blank. If incorrect, please contact your healthcare provider. Carry this list with you in case of emergency.           Current Medications     butalbital-acetaminophen-caffeine -40 mg (FIORICET, ESGIC) -40 mg per tablet Take 1 tablet by mouth every 4 (four) hours as needed for Pain.    hydrALAZINE (APRESOLINE) 25 MG tablet Take 1 tablet (25 mg total) by mouth 3 (three) times daily.    nitrofurantoin, macrocrystal-monohydrate, (MACROBID) 100 MG capsule Take 1 capsule (100 mg total) by mouth 2 (two) times daily.    prenatal  "#108-iron,carbonyl-FA 30-1 mg Tab Take 1 tablet by mouth once daily.    sulfamethoxazole-trimethoprim 400-80mg (BACTRIM) 400-80 mg per tablet Take 1 tablet by mouth 2 (two) times daily.           Clinical Reference Information           Prenatal Vitals     Enc. Date GA Prenatal Vitals Prenatal Pulse Pain Level Urine Albumin/Glucose Edema Presentation Dilation/Effacement/Station    3/21/17 35w4d 122/76 / 88 kg (194 lb 0.1 oz)           3/13/17 35w4d 122/70 / 88.9 kg (195 lb 15.8 oz)           3/12/17 35w4d Admission Dept: [facility] OB ER    3/5/17 35w4d Admission Dx: Postoperative ileus Dept: [facility]    3/3/17 35w4d Admission Dept: [facility] OB ER    3/2/17 35w4d Admission Dx: Preeclampsia, severe Dept: [facility]    17 35w3d Admission Dept: [facility] OB ER    17 35w2d 124/82 / 99 kg (218 lb 4.1 oz) 36 cm / 154 / Present  4 Negative / Negative +1 / +1  .5 / 30 / -4    2/15/17 34w3d 136/78 / 99.4 kg (219 lb 2.2 oz) 35 cm / 152 / Present  0 Negative / Negative +1 / +1      17 32w3d 130/84 / 97.2 kg (214 lb 4.6 oz) 34 cm / 154 / Present  0 Negative / Negative +1 / +1  0 / 0    17 30w3d 108/58 (A) / 96.2 kg (212 lb 1.3 oz) 32 cm / 142 / Present  0 Negative / Negative +1 / +1      17 28w3d 142/78 (A) / 94.6 kg (208 lb 8.9 oz) 30 cm / 150 / Present  0 Negative / Negative +1 / +1      16 24w4d 110/70 / 91 kg (200 lb 9.9 oz) 26 cm / +++ / Present  0 Negative / Negative None / None  0    11/10/16 20w4d 114/74 / 86.9 kg (191 lb 9.3 oz) 20 cm / +++ / Present  0           TW.6 kg (63 lb)   Pregravid weight: 70.3 kg (155 lb)   Number of babies: 1   Height: 5' 6" (1.676 m)   BMI: 25.0       Your Vitals Were     BP Height Weight Last Period BMI    122/76 5' 6" (1.676 m) 88 kg (194 lb 0.1 oz) 2016 31.31 kg/m2      Allergies as of 3/21/2017     No Known Allergies      Immunizations Administered on Date of Encounter - 3/21/2017     None      Language Assistance Services     ATTENTION: Language assistance " services are available, free of charge. Please call [phone].      ATENCIÓN: Si habla coby, tiene a kent disposición servicios gratuitos de asistencia lingüística. Llame al [phone].     CHÚ Ý: N?u b?n nói Ti?ng Vi?t, có các d?ch v? h? tr? ngôn ng? mi?n phí dành cho b?n. G?i s? [phone].         [facility] [address] complies with applicable Federal civil rights laws and does not discriminate on the basis of race, color, national origin, age, disability, or sex.

## 2017-03-22 ENCOUNTER — TELEPHONE (OUTPATIENT)
Dept: OBSTETRICS AND GYNECOLOGY | Facility: CLINIC | Age: 37
End: 2017-03-22

## 2017-03-25 ENCOUNTER — PATIENT MESSAGE (OUTPATIENT)
Dept: OBSTETRICS AND GYNECOLOGY | Facility: CLINIC | Age: 37
End: 2017-03-25

## 2018-11-14 RX ORDER — SULFAMETHOXAZOLE AND TRIMETHOPRIM 400; 80 MG/1; MG/1
TABLET ORAL
Qty: 14 TABLET | Refills: 0 | OUTPATIENT
Start: 2018-11-14

## 2018-11-15 RX ORDER — SULFAMETHOXAZOLE AND TRIMETHOPRIM 400; 80 MG/1; MG/1
TABLET ORAL
Qty: 14 TABLET | Refills: 0 | OUTPATIENT
Start: 2018-11-15

## 2021-05-12 ENCOUNTER — PATIENT MESSAGE (OUTPATIENT)
Dept: RESEARCH | Facility: HOSPITAL | Age: 41
End: 2021-05-12